# Patient Record
Sex: FEMALE | Employment: UNEMPLOYED | URBAN - METROPOLITAN AREA
[De-identification: names, ages, dates, MRNs, and addresses within clinical notes are randomized per-mention and may not be internally consistent; named-entity substitution may affect disease eponyms.]

---

## 2017-03-20 ENCOUNTER — TELEPHONE (OUTPATIENT)
Dept: INTERNAL MEDICINE CLINIC | Facility: CLINIC | Age: 42
End: 2017-03-20

## 2017-03-20 ENCOUNTER — OFFICE VISIT (OUTPATIENT)
Dept: INTERNAL MEDICINE CLINIC | Facility: CLINIC | Age: 42
End: 2017-03-20

## 2017-03-20 VITALS
BODY MASS INDEX: 24.86 KG/M2 | RESPIRATION RATE: 16 BRPM | HEART RATE: 88 BPM | SYSTOLIC BLOOD PRESSURE: 88 MMHG | DIASTOLIC BLOOD PRESSURE: 61 MMHG | WEIGHT: 164 LBS | HEIGHT: 68 IN

## 2017-03-20 DIAGNOSIS — H66.93 BILATERAL OTITIS MEDIA, UNSPECIFIED CHRONICITY, UNSPECIFIED OTITIS MEDIA TYPE: ICD-10-CM

## 2017-03-20 DIAGNOSIS — R42 VERTIGO: Primary | ICD-10-CM

## 2017-03-20 PROCEDURE — 99213 OFFICE O/P EST LOW 20 MIN: CPT | Performed by: INTERNAL MEDICINE

## 2017-03-20 PROCEDURE — 96372 THER/PROPH/DIAG INJ SC/IM: CPT | Performed by: INTERNAL MEDICINE

## 2017-03-20 PROCEDURE — 99214 OFFICE O/P EST MOD 30 MIN: CPT | Performed by: INTERNAL MEDICINE

## 2017-03-20 RX ORDER — ONDANSETRON 4 MG/1
4 TABLET, FILM COATED ORAL EVERY 8 HOURS PRN
Qty: 20 TABLET | Refills: 2 | Status: SHIPPED | OUTPATIENT
Start: 2017-03-20 | End: 2017-10-11

## 2017-03-20 RX ORDER — ONDANSETRON 4 MG/1
TABLET, FILM COATED ORAL
Qty: 20 TABLET | Refills: 2 | Status: CANCELLED | OUTPATIENT
Start: 2017-03-20

## 2017-03-20 RX ORDER — ONDANSETRON 4 MG/1
TABLET, FILM COATED ORAL
Refills: 2 | COMMUNITY
Start: 2017-02-28 | End: 2017-03-20

## 2017-03-20 RX ORDER — PREDNISONE 1 MG/1
TABLET ORAL
Qty: 15 TABLET | Refills: 0 | Status: SHIPPED | OUTPATIENT
Start: 2017-03-20 | End: 2017-10-11

## 2017-03-20 RX ORDER — AZITHROMYCIN 250 MG/1
TABLET, FILM COATED ORAL
Qty: 6 TABLET | Refills: 0 | Status: SHIPPED | OUTPATIENT
Start: 2017-03-20 | End: 2017-10-11

## 2017-03-20 RX ORDER — ONDANSETRON 2 MG/ML
4 INJECTION INTRAMUSCULAR; INTRAVENOUS ONCE
Status: COMPLETED | OUTPATIENT
Start: 2017-03-20 | End: 2017-03-20

## 2017-03-20 RX ORDER — PANTOPRAZOLE SODIUM 40 MG/1
40 TABLET, DELAYED RELEASE ORAL
Qty: 30 TABLET | Refills: 1 | Status: SHIPPED | OUTPATIENT
Start: 2017-03-20 | End: 2017-05-21

## 2017-03-20 RX ADMIN — ONDANSETRON 4 MG: 2 INJECTION INTRAMUSCULAR; INTRAVENOUS at 14:53:00

## 2017-03-20 NOTE — TELEPHONE ENCOUNTER
Pt stts she has R ear pain, dizziness and nauseous. Pt asking to speak with a nurse.   Please advise

## 2017-03-20 NOTE — TELEPHONE ENCOUNTER
Actions Requested:   Pt requesting medication / recommendations for r ear pain, dizziness and nausea. Situation/Background   Problem:  R ear pain / dizzinesss   Onset:  Last week   Associated Symptoms:  Nausea, ongoing.      History of Same:     [place]

## 2017-03-20 NOTE — ASSESSMENT & PLAN NOTE
Prednisone low doses 5 mg 2 times daily for 5 days and then once daily for 5 days. To be taken after a meal.  Z-Yovanny as directed. Call if not better in the next few days.   Avoid aspirin, ibuprofen, Motrin, Excedrin Migraine like medications due to abdomin

## 2017-03-20 NOTE — PROGRESS NOTES
HPI:    Patient ID: Raffi Tijerina is a 42year old female. Ear Pain   There is pain in the right ear. This is a new problem. The current episode started yesterday. The problem occurs constantly.  The problem has been gradually worsening (sinus pressur THEN PO Q D X 5 DAYS Disp: 15 tablet Rfl: 0   azithromycin 250 MG Oral Tab Take two tablets by mouth today, then one daily.  Disp: 6 tablet Rfl: 0   Ondansetron HCl (ZOFRAN) 4 mg tablet Take 1 tablet (4 mg total) by mouth every 8 (eight) hours as needed for Lymphadenopathy:     She has no cervical adenopathy. Neurological: She is alert and oriented to person, place, and time. She has normal reflexes. She displays no atrophy and no tremor. No cranial nerve deficit or sensory deficit.  She exhibits normal mu worsen or fail to improve. PT UNDERSTANDS AND AGREES TO FOLLOW DIRECTIONS AND ADVICE    No orders of the defined types were placed in this encounter.        Meds This Visit:  Signed Prescriptions Disp Refills    Pantoprazole Sodium 40 MG Oral Tab EC 30 tab

## 2017-03-20 NOTE — ASSESSMENT & PLAN NOTE
Vertigo with nausea and otitis media bilateral ears. No nystagmus or focal neurological deficits. Zofran IM 4 mg provided today to reduce the nausea.   Started on meclizine 12.5 mg 1 tablet 2-3 times daily as has significant fatigue at this time and wary

## 2017-03-20 NOTE — PATIENT INSTRUCTIONS
Problem List Items Addressed This Visit        Unprioritized    Otitis media of both ears     Prednisone low doses 5 mg 2 times daily for 5 days and then once daily for 5 days. To be taken after a meal.  Z-Yovanny as directed.   Call if not better in the next

## 2017-04-24 ENCOUNTER — TELEPHONE (OUTPATIENT)
Dept: INTERNAL MEDICINE CLINIC | Facility: CLINIC | Age: 42
End: 2017-04-24

## 2017-04-24 DIAGNOSIS — K90.0 CELIAC DISEASE: Primary | ICD-10-CM

## 2017-04-24 NOTE — TELEPHONE ENCOUNTER
Pt calling wanting to know if she can get labs to get her thyroid check with out coming into the office. .. please advise

## 2017-04-25 NOTE — TELEPHONE ENCOUNTER
I called pt. To see why she was requesting thyroid labs and she said she has been gaining weight since being diagnosed with celiac. Her last thyroid test was normal on 11/14/16. Please advise.

## 2017-04-26 NOTE — TELEPHONE ENCOUNTER
Orders generated. [facility], [id number] - when pt calls back, please assist w/scheduling f/u appt after labs have been completed.

## 2017-05-21 RX ORDER — PANTOPRAZOLE SODIUM 40 MG/1
TABLET, DELAYED RELEASE ORAL
Qty: 30 TABLET | Refills: 5 | Status: SHIPPED | OUTPATIENT
Start: 2017-05-21 | End: 2017-10-11

## 2017-10-05 ENCOUNTER — TELEPHONE (OUTPATIENT)
Dept: FAMILY MEDICINE CLINIC | Facility: CLINIC | Age: 42
End: 2017-10-05

## 2017-10-05 DIAGNOSIS — K90.9 INTESTINAL MALABSORPTION, UNSPECIFIED TYPE: Primary | ICD-10-CM

## 2017-10-05 NOTE — TELEPHONE ENCOUNTER
Pt is calling state that she is seeing a Nutrition next week and they are requesting some labs  Pt state that she have the orders  Want to know if she can get the lab at [facility] or do Dr REBOLLAR have to rewrite the order   Pt is requesting a call back ASAP

## 2017-10-09 NOTE — TELEPHONE ENCOUNTER
Pt called to follow up. Pt was informed to call back with what type of labs that are needed.      Pt asking for a nurse to call her  [phone]

## 2017-10-11 PROCEDURE — 82784 ASSAY IGA/IGD/IGG/IGM EACH: CPT | Performed by: INTERNAL MEDICINE

## 2017-10-11 PROCEDURE — 86255 FLUORESCENT ANTIBODY SCREEN: CPT | Performed by: INTERNAL MEDICINE

## 2017-10-11 PROCEDURE — 83516 IMMUNOASSAY NONANTIBODY: CPT | Performed by: INTERNAL MEDICINE

## 2017-10-11 PROCEDURE — 82607 VITAMIN B-12: CPT | Performed by: INTERNAL MEDICINE

## 2017-10-11 PROCEDURE — 84630 ASSAY OF ZINC: CPT | Performed by: INTERNAL MEDICINE

## 2017-10-16 NOTE — TELEPHONE ENCOUNTER
Patient called back and wants these lab tests to be ordered for her Nutrition Consult:Please advise. Blood order test pending.      Lipid profile (chemical screen, LDL,HDL and cholesterol)  Co2 Bicarbonate  ESR  CRP  Urinalysis  Blood type  Thyroid test

## 2017-10-18 ENCOUNTER — APPOINTMENT (OUTPATIENT)
Dept: LAB | Age: 42
End: 2017-10-18
Attending: INTERNAL MEDICINE
Payer: COMMERCIAL

## 2017-10-18 DIAGNOSIS — K90.9 INTESTINAL MALABSORPTION, UNSPECIFIED TYPE: ICD-10-CM

## 2017-10-18 PROCEDURE — 86140 C-REACTIVE PROTEIN: CPT

## 2017-10-18 PROCEDURE — 84439 ASSAY OF FREE THYROXINE: CPT

## 2017-10-18 PROCEDURE — 80061 LIPID PANEL: CPT

## 2017-10-18 PROCEDURE — 85652 RBC SED RATE AUTOMATED: CPT

## 2017-10-18 PROCEDURE — 81015 MICROSCOPIC EXAM OF URINE: CPT

## 2017-10-18 PROCEDURE — 84443 ASSAY THYROID STIM HORMONE: CPT

## 2017-10-18 PROCEDURE — 84481 FREE ASSAY (FT-3): CPT

## 2017-10-18 PROCEDURE — 36415 COLL VENOUS BLD VENIPUNCTURE: CPT

## 2017-10-18 NOTE — TELEPHONE ENCOUNTER
I ordered all that I can medically request,i took out the co2,blood group as cannot find necessity codes.

## 2017-10-18 NOTE — TELEPHONE ENCOUNTER
Start on vitamin d at 50,000 units once a week x 12 weeks and then go on the otc vit d at 2000 units a day

## 2017-10-18 NOTE — TELEPHONE ENCOUNTER
Pt checking status of message below; informed of Dr SMOOTH's orders and response below. Voiced understanding and agrees. Pt states Dr Erik Jones (GI) checked her vit D levels and it was low.  Per note on 10/11 result visible in chart Dr Erik Jones recommends Vit D 80

## 2017-10-25 ENCOUNTER — PATIENT MESSAGE (OUTPATIENT)
Dept: INTERNAL MEDICINE CLINIC | Facility: CLINIC | Age: 42
End: 2017-10-25

## 2017-10-26 ENCOUNTER — TELEPHONE (OUTPATIENT)
Dept: INTERNAL MEDICINE CLINIC | Facility: CLINIC | Age: 42
End: 2017-10-26

## 2017-10-27 NOTE — TELEPHONE ENCOUNTER
I left her a simple message because it did not state it was her. I said that usually not covered by insurance and if she donated blood they could give it to her.

## 2017-10-30 NOTE — TELEPHONE ENCOUNTER
From: Tavia Ayala  To: Link Maldonado MD  Sent: 10/25/2017 9:28 PM CDT  Subject: Test Results Question    Dr. Jax Cast-    What about the part of the urine sample that says many under bacteria? In red? What does that mean?   Also- do you know what blood

## 2017-11-06 PROCEDURE — 88305 TISSUE EXAM BY PATHOLOGIST: CPT | Performed by: INTERNAL MEDICINE

## 2017-11-10 RX ORDER — ERGOCALCIFEROL 1.25 MG/1
50000 CAPSULE ORAL
Qty: 12 CAPSULE | Refills: 0 | Status: SHIPPED | OUTPATIENT
Start: 2017-11-10 | End: 2018-05-30 | Stop reason: ALTCHOICE

## 2017-12-11 RX ORDER — PANTOPRAZOLE SODIUM 40 MG/1
TABLET, DELAYED RELEASE ORAL
Qty: 30 TABLET | Refills: 2 | Status: SHIPPED | OUTPATIENT
Start: 2017-12-11 | End: 2018-05-30

## 2017-12-11 NOTE — TELEPHONE ENCOUNTER
Refill Protocol Appointment Criteria  · Appointment scheduled in the past 12 months or in the next 3 months  Recent Outpatient Visits            2 months ago Celiac disease    Andrade Ramírez MD    Office Visi

## 2018-05-30 ENCOUNTER — OFFICE VISIT (OUTPATIENT)
Dept: SURGERY | Facility: CLINIC | Age: 43
End: 2018-05-30

## 2018-05-30 VITALS
BODY MASS INDEX: 26.68 KG/M2 | HEIGHT: 67 IN | SYSTOLIC BLOOD PRESSURE: 112 MMHG | RESPIRATION RATE: 18 BRPM | OXYGEN SATURATION: 98 % | DIASTOLIC BLOOD PRESSURE: 64 MMHG | HEART RATE: 80 BPM | WEIGHT: 170 LBS

## 2018-05-30 DIAGNOSIS — F41.9 ANXIETY: ICD-10-CM

## 2018-05-30 DIAGNOSIS — E53.8 VITAMIN B12 DEFICIENCY: ICD-10-CM

## 2018-05-30 DIAGNOSIS — E55.9 VITAMIN D DEFICIENCY: ICD-10-CM

## 2018-05-30 DIAGNOSIS — E66.3 OVERWEIGHT (BMI 25.0-29.9): Primary | ICD-10-CM

## 2018-05-30 DIAGNOSIS — R53.83 FATIGUE, UNSPECIFIED TYPE: ICD-10-CM

## 2018-05-30 DIAGNOSIS — E51.9 THIAMINE DEFICIENCY: ICD-10-CM

## 2018-05-30 DIAGNOSIS — R63.2 INCREASED APPETITE: ICD-10-CM

## 2018-05-30 PROCEDURE — 99204 OFFICE O/P NEW MOD 45 MIN: CPT | Performed by: NURSE PRACTITIONER

## 2018-05-30 RX ORDER — ESCITALOPRAM OXALATE 20 MG/1
TABLET ORAL
COMMUNITY
Start: 2018-02-07 | End: 2018-05-30

## 2018-05-30 NOTE — PATIENT INSTRUCTIONS
Plan:  Follow up with me in 1 month     Please try to work on the following dietary changes:  1. Drink lots of water and cut down on soda/juice consumption if soda/juice drinker  2. Eat breakfast daily (within 1 hour)  3.  Focus on protein: (15-30 grams wit

## 2018-05-30 NOTE — PROGRESS NOTES
[facility] Consultation Note       Date of Consult:      Patient:  Henri Mendez  :      1975  MRN:      [id number]    Referring Provider: Dr. Clifford ref.  provider found    Chief Complaint:  Patient presents with:  Consu times daily. Disp: 60 tablet Rfl: 1   Ondansetron HCl (ZOFRAN) 4 mg tablet Take 1 tablet (4 mg total) by mouth every 8 (eight) hours as needed for Nausea. Disp: 20 tablet Rfl: 2   Escitalopram Oxalate 20 MG Oral Tab Take 20 mg by mouth daily.  Disp:  Rfl: Plan meals in advance, Read nutrition labels, Drink 64oz of water per day, Maintain a daily food journal, No drinking 30 minutes before or after meals, Utilize portion control strategies to reduce calorie intake, Identify triggers for eating and manage cue on lexapro    Fatigue: discussed increasing exercise; should improve with weight loss    Increased appetite: Discussed healthier snacking options (veggies with hummus, fruits, nuts, low fat string cheese).  Discussed increasing exercise and keeping self bus exercise and behavior/stress management for success.      Face to face time spent with Patient this visit: 45 minutes    Follow up in 6 weeks    [facility], APN  5/30/2018

## 2018-06-02 ENCOUNTER — APPOINTMENT (OUTPATIENT)
Dept: LAB | Age: 43
End: 2018-06-02
Attending: NURSE PRACTITIONER
Payer: COMMERCIAL

## 2018-06-02 DIAGNOSIS — E55.9 VITAMIN D DEFICIENCY: ICD-10-CM

## 2018-06-02 DIAGNOSIS — E53.8 VITAMIN B12 DEFICIENCY: ICD-10-CM

## 2018-06-02 DIAGNOSIS — E51.9 THIAMINE DEFICIENCY: ICD-10-CM

## 2018-06-02 PROCEDURE — 36415 COLL VENOUS BLD VENIPUNCTURE: CPT

## 2018-06-02 PROCEDURE — 82306 VITAMIN D 25 HYDROXY: CPT

## 2018-06-02 PROCEDURE — 82607 VITAMIN B-12: CPT

## 2018-06-02 PROCEDURE — 84425 ASSAY OF VITAMIN B-1: CPT

## 2018-06-29 ENCOUNTER — NURSE TRIAGE (OUTPATIENT)
Dept: OTHER | Age: 43
End: 2018-06-29

## 2018-06-29 ENCOUNTER — HOSPITAL ENCOUNTER (EMERGENCY)
Facility: HOSPITAL | Age: 43
Discharge: HOME | End: 2018-06-29
Attending: EMERGENCY MEDICINE
Payer: COMMERCIAL

## 2018-06-29 VITALS
HEART RATE: 75 BPM | RESPIRATION RATE: 14 BRPM | OXYGEN SATURATION: 99 % | HEIGHT: 68 IN | DIASTOLIC BLOOD PRESSURE: 73 MMHG | WEIGHT: 160 LBS | SYSTOLIC BLOOD PRESSURE: 102 MMHG | TEMPERATURE: 98 F | BODY MASS INDEX: 24.25 KG/M2

## 2018-06-29 DIAGNOSIS — R42 LIGHTHEADEDNESS: Primary | ICD-10-CM

## 2018-06-29 LAB
ANION GAP SERPL CALC-SCNC: 10 MMOL/L (ref 0–18)
B-HCG UR QL: NEGATIVE
BACTERIA UR QL AUTO: NEGATIVE /HPF
BASOPHILS # BLD: 0.1 K/UL (ref 0–0.2)
BASOPHILS NFR BLD: 1 %
BILIRUB UR QL: NEGATIVE
BUN SERPL-MCNC: 11 MG/DL (ref 8–20)
BUN/CREAT SERPL: 14.3 (ref 10–20)
CALCIUM SERPL-MCNC: 9 MG/DL (ref 8.5–10.5)
CHLORIDE SERPL-SCNC: 103 MMOL/L (ref 95–110)
CO2 SERPL-SCNC: 23 MMOL/L (ref 22–32)
COLOR UR: YELLOW
CREAT SERPL-MCNC: 0.77 MG/DL (ref 0.5–1.5)
EOSINOPHIL # BLD: 0.5 K/UL (ref 0–0.7)
EOSINOPHIL NFR BLD: 7 %
ERYTHROCYTE [DISTWIDTH] IN BLOOD BY AUTOMATED COUNT: 13.7 % (ref 11–15)
GLUCOSE SERPL-MCNC: 93 MG/DL (ref 70–99)
GLUCOSE UR-MCNC: NEGATIVE MG/DL
HCT VFR BLD AUTO: 42 % (ref 35–48)
HGB BLD-MCNC: 14.3 G/DL (ref 12–16)
HGB UR QL STRIP.AUTO: NEGATIVE
KETONES UR-MCNC: NEGATIVE MG/DL
LYMPHOCYTES # BLD: 2 K/UL (ref 1–4)
LYMPHOCYTES NFR BLD: 30 %
MCH RBC QN AUTO: 28.6 PG (ref 27–32)
MCHC RBC AUTO-ENTMCNC: 34 G/DL (ref 32–37)
MCV RBC AUTO: 84.1 FL (ref 80–100)
MONOCYTES # BLD: 0.5 K/UL (ref 0–1)
MONOCYTES NFR BLD: 8 %
NEUTROPHILS # BLD AUTO: 3.5 K/UL (ref 1.8–7.7)
NEUTROPHILS NFR BLD: 54 %
NITRITE UR QL STRIP.AUTO: NEGATIVE
OSMOLALITY UR CALC.SUM OF ELEC: 281 MOSM/KG (ref 275–295)
PH UR: 5 [PH] (ref 5–8)
PLATELET # BLD AUTO: 221 K/UL (ref 140–400)
PMV BLD AUTO: 8.1 FL (ref 7.4–10.3)
POTASSIUM SERPL-SCNC: 4 MMOL/L (ref 3.3–5.1)
PROT UR-MCNC: NEGATIVE MG/DL
RBC # BLD AUTO: 4.99 M/UL (ref 3.7–5.4)
RBC #/AREA URNS AUTO: <1 /HPF
SODIUM SERPL-SCNC: 136 MMOL/L (ref 136–144)
SP GR UR STRIP: 1.02 (ref 1–1.03)
UROBILINOGEN UR STRIP-ACNC: 2
VIT C UR-MCNC: NEGATIVE MG/DL
WBC # BLD AUTO: 6.4 K/UL (ref 4–11)
WBC #/AREA URNS AUTO: <1 /HPF

## 2018-06-29 PROCEDURE — 85025 COMPLETE CBC W/AUTO DIFF WBC: CPT | Performed by: EMERGENCY MEDICINE

## 2018-06-29 PROCEDURE — 99284 EMERGENCY DEPT VISIT MOD MDM: CPT

## 2018-06-29 PROCEDURE — 93005 ELECTROCARDIOGRAM TRACING: CPT

## 2018-06-29 PROCEDURE — 93010 ELECTROCARDIOGRAM REPORT: CPT | Performed by: EMERGENCY MEDICINE

## 2018-06-29 PROCEDURE — 81001 URINALYSIS AUTO W/SCOPE: CPT | Performed by: EMERGENCY MEDICINE

## 2018-06-29 PROCEDURE — 80048 BASIC METABOLIC PNL TOTAL CA: CPT | Performed by: EMERGENCY MEDICINE

## 2018-06-29 PROCEDURE — 81025 URINE PREGNANCY TEST: CPT

## 2018-06-29 PROCEDURE — 36415 COLL VENOUS BLD VENIPUNCTURE: CPT

## 2018-06-29 NOTE — TELEPHONE ENCOUNTER
Action Requested: Summary for Provider     []  Critical Lab, Recommendations Needed  [] Need Additional Advice  []   FYI    []   Need Orders  [] Need Medications Sent to Pharmacy  []  Other     SUMMARY: Pt was advised to go to ER.     Pt states that she wok

## 2018-06-29 NOTE — PROGRESS NOTES
06/29/18 1028   Clinical Encounter Type   Visited With Family   Routine Visit Introduction   Continue Visiting Yes   Crisis Visit ED   Patient Spiritual Encounters   Spiritual Assessment Completed No   Family Spiritual Encounters   Family Coping Anxiety

## 2018-06-29 NOTE — ED PROVIDER NOTES
Patient Seen in: [facility] Emergency Department    History   Patient presents with:  Dizziness (neurologic)    Stated Complaint: Lightheaded/ Fatigue x3days    HPI    Patient is a 13-year-old female who presents to the emergency department with a Head: Normocephalic and atraumatic. Eyes: Conjunctivae and EOM are normal. Pupils are equal, round, and reactive to light. Neck: Neck supple. Cardiovascular: Normal rate, regular rhythm, normal heart sounds and normal pulses.     Pulmonary/Chest: Ef Impression:  Lightheadedness  (primary encounter diagnosis)    Disposition:  Discharge  6/29/2018 12:29 pm    Follow-up:  Jesisca Flores MD  [address] [place], [place] [address]Molly Oviedo [facility]  [phone]    Schedule an appointment as soon as possible for a visit

## 2018-06-29 NOTE — ED INITIAL ASSESSMENT (HPI)
Patient presents with c/o \"exhaustion \" for months. Jarrett Pu reports feeling lightheaded the last few days.  Also feels stressed out and \"down in the dumps\"    Denies pain

## 2018-06-30 NOTE — TELEPHONE ENCOUNTER
Per ED notes, pt to follow up with Dr. Pilar Santos for ED follow up. Call center please call pt and help set up appt.

## 2018-08-28 ENCOUNTER — OFFICE VISIT (OUTPATIENT)
Dept: INTERNAL MEDICINE CLINIC | Facility: CLINIC | Age: 43
End: 2018-08-28
Payer: COMMERCIAL

## 2018-08-28 ENCOUNTER — TELEPHONE (OUTPATIENT)
Dept: GASTROENTEROLOGY | Facility: CLINIC | Age: 43
End: 2018-08-28

## 2018-08-28 VITALS
RESPIRATION RATE: 16 BRPM | WEIGHT: 170 LBS | SYSTOLIC BLOOD PRESSURE: 111 MMHG | BODY MASS INDEX: 26.68 KG/M2 | HEART RATE: 80 BPM | HEIGHT: 67 IN | DIASTOLIC BLOOD PRESSURE: 80 MMHG

## 2018-08-28 DIAGNOSIS — E78.2 MIXED HYPERLIPIDEMIA: ICD-10-CM

## 2018-08-28 DIAGNOSIS — E55.9 VITAMIN D DEFICIENCY: ICD-10-CM

## 2018-08-28 DIAGNOSIS — K90.0 CELIAC DISEASE: ICD-10-CM

## 2018-08-28 DIAGNOSIS — R53.83 FATIGUE, UNSPECIFIED TYPE: Primary | ICD-10-CM

## 2018-08-28 DIAGNOSIS — K59.01 SLOW TRANSIT CONSTIPATION: ICD-10-CM

## 2018-08-28 DIAGNOSIS — E53.8 VITAMIN B12 DEFICIENCY: ICD-10-CM

## 2018-08-28 PROBLEM — E78.5 HYPERLIPIDEMIA: Status: ACTIVE | Noted: 2018-08-28

## 2018-08-28 PROCEDURE — 99212 OFFICE O/P EST SF 10 MIN: CPT | Performed by: INTERNAL MEDICINE

## 2018-08-28 PROCEDURE — 99215 OFFICE O/P EST HI 40 MIN: CPT | Performed by: INTERNAL MEDICINE

## 2018-08-28 RX ORDER — ROSUVASTATIN CALCIUM 5 MG/1
5 TABLET, COATED ORAL NIGHTLY
Qty: 30 TABLET | Refills: 3 | Status: SHIPPED | OUTPATIENT
Start: 2018-08-28 | End: 2018-12-30

## 2018-08-28 NOTE — PROGRESS NOTES
HPI:    Patient ID: Sherryle Olp is a 39year old female.     Last labs 10/2018    Written by Jessica Flores MD on 10/25/2017  9:20 PM   The thyroid function tests look normal.   The C-reactive protein as well as  sed rate looks normal.   The cholestero Allergies:  Gluten Flour            UNKNOWN      08/28/18  1210   BP: 111/80   Pulse: 80   Resp: 16     Body mass index is 26.63 kg/m². PHYSICAL EXAM:   Physical Exam   Constitutional: She is oriented to person, place, and time.  She appears well-dev of colon cancer. Patient is advised to follow-up with gastroenterology for possible colonoscopy.   Consider Linzess if symptoms do not improve after adequate hydration and soluble fiber supplementation         Vitamin D deficiency    Relevant Orders    VIT Comp Metabolic Panel (14) [E]      Lipid Panel [E]    Meds This Visit:  Signed Prescriptions Disp Refills    Rosuvastatin Calcium 5 MG Oral Tab 30 tablet 3      Sig: Take 1 tablet (5 mg total) by mouth nightly.            Imaging & Referrals:  None

## 2018-08-28 NOTE — ASSESSMENT & PLAN NOTE
Start on soluble fiber supplementation–Benefiber/Citrucel/Metamucil by 2 tablespoons once daily. Drink plenty of fluids. Increase vegetables like spinach, mushrooms, zucchini and implants in the diet. Multiple small meals.   Follow-up after gastroenterol

## 2018-08-28 NOTE — TELEPHONE ENCOUNTER
Slow transit constipation        Start on soluble fiber supplementation–Benefiber/Citrucel/Metamucil by 2 tablespoons once daily. Drink plenty of fluids. Increase vegetables like spinach, mushrooms, zucchini and implants in the diet.   Multiple small meal

## 2018-08-28 NOTE — TELEPHONE ENCOUNTER
Spoke to pt. appt scheduled with Dr Tyrell Johnsotn. Date, time and location verified.     Pt states she has been dx with Small Intestine Bacterial Overgrowth as well    Future Appointments  Date Time Provider Melissa Rodriguez   9/19/2018 10:30 AM Governor Bharat

## 2018-08-28 NOTE — ASSESSMENT & PLAN NOTE
Ongoing history of abdominal pain, bloating. She has been seen in [facility] as well as per gastroenterology here. She has been having increasing problems with constipation additionally. There is family history of colon cancer.   Patient is ad

## 2018-08-28 NOTE — ASSESSMENT & PLAN NOTE
Significantly elevated LDL cholesterol with increased risk for heart attacks and strokes. Recheck labs as directed within a day or 2. Start on Crestor 5 mg daily. Repeat labs in 6 weeks after starting on medication.   Strict dietary restrictions of fatty

## 2018-08-28 NOTE — PATIENT INSTRUCTIONS
Problem List Items Addressed This Visit        Unprioritized    Celiac disease     Ongoing history of abdominal pain, bloating. She has been seen in [facility] as well as per gastroenterology here.   She has been having increasing problems with

## 2018-08-29 ENCOUNTER — LAB ENCOUNTER (OUTPATIENT)
Dept: LAB | Age: 43
End: 2018-08-29
Attending: INTERNAL MEDICINE
Payer: COMMERCIAL

## 2018-08-29 DIAGNOSIS — R53.83 FATIGUE, UNSPECIFIED TYPE: ICD-10-CM

## 2018-08-29 DIAGNOSIS — E78.2 MIXED HYPERLIPIDEMIA: ICD-10-CM

## 2018-08-29 DIAGNOSIS — E55.9 VITAMIN D DEFICIENCY: ICD-10-CM

## 2018-08-29 DIAGNOSIS — E53.8 VITAMIN B12 DEFICIENCY: ICD-10-CM

## 2018-08-29 LAB
25(OH)D3 SERPL-MCNC: 32.5 NG/ML
ALBUMIN SERPL BCP-MCNC: 4 G/DL (ref 3.5–4.8)
ALBUMIN/GLOB SERPL: 1.5 {RATIO} (ref 1–2)
ALP SERPL-CCNC: 47 U/L (ref 32–100)
ALT SERPL-CCNC: 23 U/L (ref 14–54)
ANION GAP SERPL CALC-SCNC: 10 MMOL/L (ref 0–18)
AST SERPL-CCNC: 23 U/L (ref 15–41)
BASOPHILS # BLD: 0 K/UL (ref 0–0.2)
BASOPHILS NFR BLD: 1 %
BILIRUB SERPL-MCNC: 0.8 MG/DL (ref 0.3–1.2)
BILIRUB UR QL: NEGATIVE
BUN SERPL-MCNC: 11 MG/DL (ref 8–20)
BUN/CREAT SERPL: 15.3 (ref 10–20)
CALCIUM SERPL-MCNC: 9 MG/DL (ref 8.5–10.5)
CHLORIDE SERPL-SCNC: 104 MMOL/L (ref 95–110)
CHOLEST SERPL-MCNC: 207 MG/DL (ref 110–200)
CO2 SERPL-SCNC: 25 MMOL/L (ref 22–32)
COLOR UR: YELLOW
CREAT SERPL-MCNC: 0.72 MG/DL (ref 0.5–1.5)
EOSINOPHIL # BLD: 0.1 K/UL (ref 0–0.7)
EOSINOPHIL NFR BLD: 3 %
ERYTHROCYTE [DISTWIDTH] IN BLOOD BY AUTOMATED COUNT: 13.7 % (ref 11–15)
GLOBULIN PLAS-MCNC: 2.7 G/DL (ref 2.5–3.7)
GLUCOSE SERPL-MCNC: 90 MG/DL (ref 70–99)
GLUCOSE UR-MCNC: NEGATIVE MG/DL
HCT VFR BLD AUTO: 39.3 % (ref 35–48)
HDLC SERPL-MCNC: 35 MG/DL
HGB BLD-MCNC: 13.3 G/DL (ref 12–16)
HGB UR QL STRIP.AUTO: NEGATIVE
KETONES UR-MCNC: NEGATIVE MG/DL
LDLC SERPL CALC-MCNC: 149 MG/DL (ref 0–99)
LEUKOCYTE ESTERASE UR QL STRIP.AUTO: NEGATIVE
LYMPHOCYTES # BLD: 1.4 K/UL (ref 1–4)
LYMPHOCYTES NFR BLD: 31 %
MCH RBC QN AUTO: 28.7 PG (ref 27–32)
MCHC RBC AUTO-ENTMCNC: 33.8 G/DL (ref 32–37)
MCV RBC AUTO: 85 FL (ref 80–100)
MONOCYTES # BLD: 0.3 K/UL (ref 0–1)
MONOCYTES NFR BLD: 6 %
NEUTROPHILS # BLD AUTO: 2.6 K/UL (ref 1.8–7.7)
NEUTROPHILS NFR BLD: 59 %
NITRITE UR QL STRIP.AUTO: NEGATIVE
NONHDLC SERPL-MCNC: 172 MG/DL
OSMOLALITY UR CALC.SUM OF ELEC: 287 MOSM/KG (ref 275–295)
PATIENT FASTING: YES
PH UR: 6 [PH] (ref 5–8)
PLATELET # BLD AUTO: 206 K/UL (ref 140–400)
PMV BLD AUTO: 8.9 FL (ref 7.4–10.3)
POTASSIUM SERPL-SCNC: 3.8 MMOL/L (ref 3.3–5.1)
PROT SERPL-MCNC: 6.7 G/DL (ref 5.9–8.4)
PROT UR-MCNC: 30 MG/DL
RBC # BLD AUTO: 4.63 M/UL (ref 3.7–5.4)
RBC #/AREA URNS AUTO: 1 /HPF
SODIUM SERPL-SCNC: 139 MMOL/L (ref 136–144)
SP GR UR STRIP: 1.03 (ref 1–1.03)
TRIGL SERPL-MCNC: 117 MG/DL (ref 1–149)
TSH SERPL-ACNC: 0.92 UIU/ML (ref 0.45–5.33)
UROBILINOGEN UR STRIP-ACNC: 2
VIT B12 SERPL-MCNC: 1472 PG/ML (ref 181–914)
VIT C UR-MCNC: NEGATIVE MG/DL
WBC # BLD AUTO: 4.4 K/UL (ref 4–11)
WBC #/AREA URNS AUTO: 2 /HPF

## 2018-08-29 PROCEDURE — 84443 ASSAY THYROID STIM HORMONE: CPT

## 2018-08-29 PROCEDURE — 82306 VITAMIN D 25 HYDROXY: CPT

## 2018-08-29 PROCEDURE — 81001 URINALYSIS AUTO W/SCOPE: CPT

## 2018-08-29 PROCEDURE — 80061 LIPID PANEL: CPT

## 2018-08-29 PROCEDURE — 85025 COMPLETE CBC W/AUTO DIFF WBC: CPT

## 2018-08-29 PROCEDURE — 80053 COMPREHEN METABOLIC PANEL: CPT

## 2018-08-29 PROCEDURE — 82607 VITAMIN B-12: CPT

## 2018-08-29 PROCEDURE — 36415 COLL VENOUS BLD VENIPUNCTURE: CPT

## 2018-08-31 ENCOUNTER — TELEPHONE (OUTPATIENT)
Dept: OTHER | Age: 43
End: 2018-08-31

## 2018-08-31 DIAGNOSIS — E78.00 ELEVATED CHOLESTEROL: Primary | ICD-10-CM

## 2018-08-31 NOTE — TELEPHONE ENCOUNTER
Patient calling regarding lab results done on 08/29/18. Patient very anxious about getting results. Would like to get results today with Dr. Zahra Zheng recommendations.

## 2018-08-31 NOTE — TELEPHONE ENCOUNTER
Notes recorded by Chaya Jeffers MD on 8/31/2018 at 5:11 PM CDT  The blood counts look normal.no anemia.   The sugars ,calcium and electrolytes are normal.  The kidney and liver functions look normal.  The thyroid functions look normal.  The urine tests are

## 2018-09-04 NOTE — TELEPHONE ENCOUNTER
Advised patient on Dr. Apolonia Weathers information and recommendations. Patient verbalized understanding. She has started taking the statin. Lab order created. Advised to call back if needed.

## 2018-09-19 ENCOUNTER — TELEPHONE (OUTPATIENT)
Dept: GASTROENTEROLOGY | Facility: CLINIC | Age: 43
End: 2018-09-19

## 2018-09-19 NOTE — TELEPHONE ENCOUNTER
Pt states she was up all night throwing up and could not make todays appointment and was diagnosed with sibo .  Please call thank you

## 2018-09-24 NOTE — TELEPHONE ENCOUNTER
I spoke to the pt. We made a f/u appt with Dr Hussain Snow. She has a copy of her breath test that confirms the SIBO.  Date time and location verified    Future Appointments   Date Time Provider Melisas Rodriguez   10/15/2018  2:00 PM Orest Habermann, MD

## 2018-10-15 ENCOUNTER — TELEPHONE (OUTPATIENT)
Dept: GASTROENTEROLOGY | Facility: CLINIC | Age: 43
End: 2018-10-15

## 2018-10-15 ENCOUNTER — OFFICE VISIT (OUTPATIENT)
Dept: GASTROENTEROLOGY | Facility: CLINIC | Age: 43
End: 2018-10-15
Payer: COMMERCIAL

## 2018-10-15 VITALS
HEIGHT: 67.75 IN | BODY MASS INDEX: 26.89 KG/M2 | SYSTOLIC BLOOD PRESSURE: 114 MMHG | WEIGHT: 175.38 LBS | HEART RATE: 83 BPM | DIASTOLIC BLOOD PRESSURE: 79 MMHG

## 2018-10-15 DIAGNOSIS — K90.0 CELIAC DISEASE: Primary | ICD-10-CM

## 2018-10-15 DIAGNOSIS — K59.01 SLOW TRANSIT CONSTIPATION: ICD-10-CM

## 2018-10-15 DIAGNOSIS — R11.0 NAUSEA: ICD-10-CM

## 2018-10-15 DIAGNOSIS — K58.1 IRRITABLE BOWEL SYNDROME WITH CONSTIPATION: ICD-10-CM

## 2018-10-15 PROCEDURE — 99214 OFFICE O/P EST MOD 30 MIN: CPT | Performed by: INTERNAL MEDICINE

## 2018-10-15 PROCEDURE — 99212 OFFICE O/P EST SF 10 MIN: CPT | Performed by: INTERNAL MEDICINE

## 2018-10-15 RX ORDER — ONDANSETRON 4 MG/1
TABLET, FILM COATED ORAL EVERY 12 HOURS PRN
Qty: 30 TABLET | Refills: 11 | Status: SHIPPED | OUTPATIENT
Start: 2018-10-15 | End: 2020-11-03

## 2018-10-15 NOTE — TELEPHONE ENCOUNTER
[address]    MR #: [id number]  : 1975  [facility]    PHYSICIAN: Chano Velásquez M.D. Operative Report      DATE OF PROCEDURE:  2016      PREOPERATIVE DIAGNOSES:  1. Dyspepsia.   2. Abdominal Retroflexion was performed in the stomach at the beginning of the case. EGD FINDINGS: Normal esophagus down to the GE junction. Normal Z-line and GE junction.  Further in, the cardia, fundus, and body of the stomach were entirely normal. There were deep determining whether Ms. Sesay suffers celiac disease. Her fairly troubling symptoms are consistent both with celiac disease or stress, functional process. High positive celiac serology was 10 days ago.          Electronically Approved by:      Lesly Tejeda

## 2018-10-15 NOTE — PROGRESS NOTES
HPI:    Patient ID: Ronald Anthony returns today with her very supportive mother for follow-up. After the initial visit below, EGD and biopsies 4/28/2016 along with positive celiac serologies, anemia were diagnostic of celiac disease.     Ms. Swati Fletcher we previously followed only with her gynecologist and the immediate care centers. Recently saw Dr. Mario Mendez to initiate care with an Internist.    She discussed lifelong symptoms includin. Lifelong constipation.   She can go up to 1 week without passing far.    Non-smoker.    ======================    [facility]     Chris Chavez    MR #: [id number]  : 1975  [facility]    PHYSICIAN: Anitha Bernal M.D.        Operative Report      DATE OF PROCEDURE:  2016        Rossana Levi portions of duodenum and on to the jejunum. Retroflexion was performed in the stomach at the beginning of the case. EGD FINDINGS: Normal esophagus down to the GE junction. Normal Z-line and GE junction.  Further in, the cardia, fundus, and body of the st follow after review of the above biopsies in determining whether Ms. Sesay suffers celiac disease. Her fairly troubling symptoms are consistent both with celiac disease or stress, functional process. High positive celiac serology was 10 days ago. 11/6/2017     Referring physician: Luis Eduardo Cobian MD     Surgeon:  Sammie Frausto.  Jose Espinosa MD     Pre-op diagnosis: Celiac disease     Post-op diagnosis: Same     Medications given:  MAC        Procedure:                After informed consent, discussion of risks PAIN    Comment:Constipation   PHYSICAL EXAM:   Physical Exam              ASSESSMENT/PLAN:   No diagnosis found. Labs 8/29/2018:  Normal CBC with hemoglobin 13.3 g MCV 85  Normal CMP with AST 23 ALT 23 albumin 4.0 8/29/2018.     Vitamin B12 174 ( for ?small bowel bacterial overgrowth by testing with an alternative medicine provider. Suggest:    1. Celiac disease by serologies and biopsy  · Partial, incomplete response to gluten-free diet. Mild–moderate improvement only.   No weight loss or weig

## 2018-11-08 ENCOUNTER — OFFICE VISIT (OUTPATIENT)
Dept: SURGERY | Facility: CLINIC | Age: 43
End: 2018-11-08
Payer: COMMERCIAL

## 2018-11-08 VITALS
DIASTOLIC BLOOD PRESSURE: 83 MMHG | WEIGHT: 176 LBS | HEART RATE: 74 BPM | OXYGEN SATURATION: 99 % | HEIGHT: 67.5 IN | RESPIRATION RATE: 18 BRPM | SYSTOLIC BLOOD PRESSURE: 115 MMHG | BODY MASS INDEX: 27.3 KG/M2

## 2018-11-08 DIAGNOSIS — Z51.81 ENCOUNTER FOR THERAPEUTIC DRUG MONITORING: ICD-10-CM

## 2018-11-08 DIAGNOSIS — F43.9 STRESS: ICD-10-CM

## 2018-11-08 DIAGNOSIS — E66.3 OVERWEIGHT (BMI 25.0-29.9): ICD-10-CM

## 2018-11-08 DIAGNOSIS — E78.00 HYPERCHOLESTEREMIA: Primary | ICD-10-CM

## 2018-11-08 PROCEDURE — 99214 OFFICE O/P EST MOD 30 MIN: CPT | Performed by: INTERNAL MEDICINE

## 2018-11-08 RX ORDER — LACTOBACIL 2/BIFIDO 1/S.THERMO 450B CELL
1 PACKET (EA) ORAL DAILY
Qty: 60 CAPSULE | Refills: 2 | Status: SHIPPED | OUTPATIENT
Start: 2018-11-08 | End: 2019-03-20 | Stop reason: ALTCHOICE

## 2018-11-08 RX ORDER — TOPIRAMATE 25 MG/1
25 TABLET ORAL EVERY EVENING
Qty: 30 TABLET | Refills: 2 | Status: SHIPPED | OUTPATIENT
Start: 2018-11-08 | End: 2019-02-13

## 2018-11-08 NOTE — PROGRESS NOTES
[address], [address]  Dept: [phone]       Patient:  Leeroy Greene  :      1975  MRN:      [id number]    Chief Complaint:  Patient presents w mouth nightly. Disp: 30 tablet Rfl: 3   Ondansetron HCl (ZOFRAN) 4 mg tablet Take 1 tablet (4 mg total) by mouth every 8 (eight) hours as needed for Nausea. Disp: 20 tablet Rfl: 2   Escitalopram Oxalate 20 MG Oral Tab Take 20 mg by mouth daily.  Disp:  Rfl: exercising? no  · Type of exercise?      Eating Habits  · Patient states the following:  · Eats 3 meal(s) per day  · Length of time it takes to consume a meal:  20  · # of snacks per day: 1 Type of snacks:  Sugar (chocolate)  · Amount of soda consumption pe cholesterol has been well controlled on her current medication.     Lab Results   Component Value Date/Time    CHOLEST 207 (H) 08/29/2018 08:54 AM     (H) 08/29/2018 08:54 AM    HDL 35 08/29/2018 08:54 AM    TRIG 117 08/29/2018 08:54 AM       Encount

## 2018-12-30 DIAGNOSIS — E78.2 MIXED HYPERLIPIDEMIA: ICD-10-CM

## 2019-01-05 RX ORDER — ROSUVASTATIN CALCIUM 5 MG/1
TABLET, COATED ORAL
Qty: 30 TABLET | Refills: 3 | Status: SHIPPED | OUTPATIENT
Start: 2019-01-05 | End: 2019-04-30

## 2019-01-15 ENCOUNTER — TELEPHONE (OUTPATIENT)
Dept: OTHER | Age: 44
End: 2019-01-15

## 2019-01-15 NOTE — TELEPHONE ENCOUNTER
Spoke with patient who is requesting to know when she was suppose to return to the clinic for a lipid panel draw.  Patient was made aware she was suppose to return to the office 6 weeks after her last draw on 8/29/18 since she was started on Crestor at that

## 2019-01-16 ENCOUNTER — APPOINTMENT (OUTPATIENT)
Dept: LAB | Age: 44
End: 2019-01-16
Attending: INTERNAL MEDICINE
Payer: COMMERCIAL

## 2019-01-16 DIAGNOSIS — E78.2 MIXED HYPERLIPIDEMIA: ICD-10-CM

## 2019-01-16 DIAGNOSIS — E78.00 ELEVATED CHOLESTEROL: ICD-10-CM

## 2019-01-16 LAB
ALBUMIN SERPL BCP-MCNC: 4.2 G/DL (ref 3.5–4.8)
ALBUMIN/GLOB SERPL: 1.5 {RATIO} (ref 1–2)
ALP SERPL-CCNC: 52 U/L (ref 32–100)
ALT SERPL-CCNC: 21 U/L (ref 14–54)
ANION GAP SERPL CALC-SCNC: 8 MMOL/L (ref 0–18)
AST SERPL-CCNC: 23 U/L (ref 15–41)
BILIRUB SERPL-MCNC: 0.9 MG/DL (ref 0.3–1.2)
BUN SERPL-MCNC: 18 MG/DL (ref 8–20)
BUN/CREAT SERPL: 19.6 (ref 10–20)
CALCIUM SERPL-MCNC: 9.2 MG/DL (ref 8.5–10.5)
CHLORIDE SERPL-SCNC: 106 MMOL/L (ref 95–110)
CHOLEST SERPL-MCNC: 218 MG/DL (ref 110–200)
CO2 SERPL-SCNC: 23 MMOL/L (ref 22–32)
CREAT SERPL-MCNC: 0.92 MG/DL (ref 0.5–1.5)
GLOBULIN PLAS-MCNC: 2.8 G/DL (ref 2.5–3.7)
GLUCOSE SERPL-MCNC: 88 MG/DL (ref 70–99)
HDLC SERPL-MCNC: 38 MG/DL
LDLC SERPL CALC-MCNC: 164 MG/DL (ref 0–99)
NONHDLC SERPL-MCNC: 180 MG/DL
OSMOLALITY UR CALC.SUM OF ELEC: 285 MOSM/KG (ref 275–295)
PATIENT FASTING: YES
POTASSIUM SERPL-SCNC: 3.6 MMOL/L (ref 3.3–5.1)
PROT SERPL-MCNC: 7 G/DL (ref 5.9–8.4)
SODIUM SERPL-SCNC: 137 MMOL/L (ref 136–144)
TRIGL SERPL-MCNC: 79 MG/DL (ref 1–149)

## 2019-01-16 PROCEDURE — 80061 LIPID PANEL: CPT

## 2019-01-16 PROCEDURE — 36415 COLL VENOUS BLD VENIPUNCTURE: CPT

## 2019-01-16 PROCEDURE — 80053 COMPREHEN METABOLIC PANEL: CPT

## 2019-01-18 ENCOUNTER — OFFICE VISIT (OUTPATIENT)
Dept: OTOLARYNGOLOGY | Facility: CLINIC | Age: 44
End: 2019-01-18
Payer: COMMERCIAL

## 2019-01-18 VITALS
DIASTOLIC BLOOD PRESSURE: 69 MMHG | WEIGHT: 155 LBS | TEMPERATURE: 98 F | SYSTOLIC BLOOD PRESSURE: 93 MMHG | HEIGHT: 68 IN | BODY MASS INDEX: 23.49 KG/M2

## 2019-01-18 DIAGNOSIS — J34.89 NASAL LESION: ICD-10-CM

## 2019-01-18 DIAGNOSIS — H92.01 RIGHT EAR PAIN: Primary | ICD-10-CM

## 2019-01-18 PROCEDURE — 99243 OFF/OP CNSLTJ NEW/EST LOW 30: CPT | Performed by: OTOLARYNGOLOGY

## 2019-01-18 PROCEDURE — 99212 OFFICE O/P EST SF 10 MIN: CPT | Performed by: OTOLARYNGOLOGY

## 2019-01-18 NOTE — PROGRESS NOTES
Eryn Mancilla is a 39year old female. Patient presents with:  Ear Problem: on and off right ear pain for 2 months    HPI:   She has had pain in her right ear intermittently for the last 2 months.   The pain can be very severe and last for a few days at weight gain  SKIN: denies any unusual skin lesions or rashes  RESPIRATORY: denies shortness of breath with exertion  NEURO: denies headaches    EXAM:   BP 93/69   Temp 97.9 °F (36.6 °C) (Tympanic)   Ht 5' 8\" (1.727 m)   Wt 155 lb (70.3 kg)   BMI 23.57 kg/ issues and agrees to the plan. Bj Castellanos MD  1/18/2019  11:39 AM

## 2019-01-24 ENCOUNTER — TELEPHONE (OUTPATIENT)
Dept: OTHER | Age: 44
End: 2019-01-24

## 2019-01-24 NOTE — TELEPHONE ENCOUNTER
Please advise on Appt for Next week Monday or Tuesday   Pt stated she have an appt scheduled for today  C/C dizziness/review test results  but now have to Cancel d/t her son has an emergency appt with the cardiologist because he have been lightheaded  And

## 2019-01-25 NOTE — TELEPHONE ENCOUNTER
Tried to call pt but after several rings it turns to busy signal. Called back pt @ 8:07AM and LMTCB see msg below.

## 2019-02-06 ENCOUNTER — TELEPHONE (OUTPATIENT)
Dept: SURGERY | Facility: CLINIC | Age: 44
End: 2019-02-06

## 2019-02-13 ENCOUNTER — OFFICE VISIT (OUTPATIENT)
Dept: SURGERY | Facility: CLINIC | Age: 44
End: 2019-02-13
Payer: COMMERCIAL

## 2019-02-13 VITALS
RESPIRATION RATE: 18 BRPM | OXYGEN SATURATION: 98 % | HEIGHT: 68 IN | SYSTOLIC BLOOD PRESSURE: 116 MMHG | HEART RATE: 87 BPM | WEIGHT: 180.25 LBS | BODY MASS INDEX: 27.32 KG/M2 | DIASTOLIC BLOOD PRESSURE: 80 MMHG

## 2019-02-13 DIAGNOSIS — F43.9 STRESS: ICD-10-CM

## 2019-02-13 DIAGNOSIS — Z51.81 ENCOUNTER FOR THERAPEUTIC DRUG MONITORING: ICD-10-CM

## 2019-02-13 DIAGNOSIS — E78.00 HYPERCHOLESTEREMIA: Primary | ICD-10-CM

## 2019-02-13 DIAGNOSIS — R63.5 WEIGHT GAIN: ICD-10-CM

## 2019-02-13 DIAGNOSIS — R63.2 INCREASED APPETITE: ICD-10-CM

## 2019-02-13 PROCEDURE — 99214 OFFICE O/P EST MOD 30 MIN: CPT | Performed by: INTERNAL MEDICINE

## 2019-02-13 NOTE — PROGRESS NOTES
[address]  Dept: [phone]       Patient:  Leeroy Greene  :      1975  MRN:      [id number]    Chief Complaint:  Patient presents w on file    Social Needs      Financial resource strain: Not on file      Food insecurity - worry: Not on file      Food insecurity - inability: Not on file      Transportation needs - medical: Not on file      Transportation needs - non-medical: Not on brett fresh fruits or vegetables daily    Behavior Modifications Reviewed and Discussed  Eat breakfast, Eat 3 meals per day, Plan meals in advance, Read nutrition labels, Drink 64 oz of water per day, Maintain a daily food journal, No drinking 30 minutes before at this time. Stress: may benefit from zoloft. Does not want to switch yet    DYSLIPIDEMIA: Stable on the above prescribed meal plan and medication. Liver function stable.     Lab Results   Component Value Date/Time    CHOLEST 218 (H) 01/16/2019 08:50

## 2019-03-12 ENCOUNTER — APPOINTMENT (OUTPATIENT)
Dept: FAMILY MEDICINE CLINIC | Facility: CLINIC | Age: 44
End: 2019-03-12
Payer: COMMERCIAL

## 2019-03-13 RX ORDER — TOPIRAMATE 25 MG/1
25 TABLET ORAL EVERY EVENING
Qty: 30 TABLET | Refills: 2 | Status: SHIPPED | OUTPATIENT
Start: 2019-03-13 | End: 2019-03-20 | Stop reason: ALTCHOICE

## 2019-03-20 ENCOUNTER — OFFICE VISIT (OUTPATIENT)
Dept: INTERNAL MEDICINE CLINIC | Facility: CLINIC | Age: 44
End: 2019-03-20
Payer: COMMERCIAL

## 2019-03-20 ENCOUNTER — TELEPHONE (OUTPATIENT)
Dept: INTERNAL MEDICINE CLINIC | Facility: CLINIC | Age: 44
End: 2019-03-20

## 2019-03-20 ENCOUNTER — NURSE TRIAGE (OUTPATIENT)
Dept: OTHER | Age: 44
End: 2019-03-20

## 2019-03-20 VITALS
WEIGHT: 185 LBS | HEIGHT: 68 IN | BODY MASS INDEX: 28.04 KG/M2 | SYSTOLIC BLOOD PRESSURE: 121 MMHG | HEART RATE: 91 BPM | DIASTOLIC BLOOD PRESSURE: 83 MMHG | TEMPERATURE: 98 F

## 2019-03-20 DIAGNOSIS — J06.9 URI WITH COUGH AND CONGESTION: Primary | ICD-10-CM

## 2019-03-20 PROCEDURE — 99212 OFFICE O/P EST SF 10 MIN: CPT | Performed by: PHYSICIAN ASSISTANT

## 2019-03-20 PROCEDURE — 99213 OFFICE O/P EST LOW 20 MIN: CPT | Performed by: PHYSICIAN ASSISTANT

## 2019-03-20 RX ORDER — CODEINE PHOSPHATE AND GUAIFENESIN 10; 100 MG/5ML; MG/5ML
5 SOLUTION ORAL EVERY 6 HOURS PRN
Qty: 1 BOTTLE | Refills: 0 | Status: SHIPPED | OUTPATIENT
Start: 2019-03-20 | End: 2020-11-12

## 2019-03-20 RX ORDER — AZITHROMYCIN 250 MG/1
TABLET, FILM COATED ORAL
Qty: 6 TABLET | Refills: 0 | Status: SHIPPED | OUTPATIENT
Start: 2019-03-20 | End: 2020-11-12

## 2019-03-20 NOTE — TELEPHONE ENCOUNTER
Pt would like to know if she can be seen before May for follow up on Cholesterol pt will be out of town until  April 1, 2019.

## 2019-03-20 NOTE — TELEPHONE ENCOUNTER
Action Requested: Summary for Provider     []  Critical Lab, Recommendations Needed  [] Need Additional Advice  [x]   FYI    []   Need Orders  [] Need Medications Sent to Pharmacy  []  Other     SUMMARY: dry cough, sinus pressure, headache, sore throat, po

## 2019-03-20 NOTE — PROGRESS NOTES
HPI:    Patient ID: Bladimir Quispe is a 39year old female. HPI     Patient presents complaining of a dry cough, sinus pressure, headache, sore throat, postnasal drip, body aches, chills, nausea and rhinorrhea that is clear for the past 9 days.  She st Pregnancy     Per NG: vaginal deliveries times 3   • S/P cystoscopy 2014    Per NG: Stent placement cystoscopy   • S/P cystoscopy 2014    per NG: Stent removal cystoscopy   • Urolithiasis    • Vertigo 2013     Social History    Tobacco Use      Smoking sta gallop and no friction rub. No murmur heard. Edema not present. Pulmonary/Chest: Effort normal and breath sounds normal. No respiratory distress. She has no wheezes. She has no rales. Cough with expiration    Abdominal: Soft.  Bowel sounds are nicolette

## 2019-03-21 ENCOUNTER — TELEPHONE (OUTPATIENT)
Dept: OTHER | Age: 44
End: 2019-03-21

## 2019-03-21 NOTE — TELEPHONE ENCOUNTER
Received a call from [address] [place] [address] who reports the prescription for Cheratussin [facility] written yesterday 3/20/19 orders for one bottle to be dispensed, but the pharmacy reports that is too much medicine to be dispensed.  Pharmacy reports usually 4-6oz of the med

## 2019-03-21 NOTE — TELEPHONE ENCOUNTER
Advised patient of Dr Tomasa Ward note below and that will call in to [facility] right now. Patient verbalized understanding. Advised [facility] pharmacist-Irma of Dr. Tomasa Ward note. Pharmacist verbalized understanding.

## 2019-04-08 ENCOUNTER — HOSPITAL ENCOUNTER (OUTPATIENT)
Dept: CT IMAGING | Age: 44
Discharge: HOME | End: 2019-04-08
Attending: INTERNAL MEDICINE

## 2019-04-08 DIAGNOSIS — Z13.6 ENCOUNTER FOR SCREENING FOR CARDIOVASCULAR DISORDERS: ICD-10-CM

## 2019-04-08 NOTE — PROGRESS NOTES
Pt seen at [facility], [facility] for CTHS:  PRELIMINARY SCORE= 82.0  BP= 98/78    Cholestec labs as follows: Nonfasting  TC= 188  HDL= 37  LDL= 126  TG= 123  GLUCOSE= 85    All results and risk factors discussed with patient; all questions and concerns addressed.

## 2019-04-12 NOTE — TELEPHONE ENCOUNTER
Please set up an appointment for this patient in the next few weeks before April 21, may use any available appointment or add at the lunch slot-as long as no other patients are booked at that time

## 2019-04-30 DIAGNOSIS — E78.2 MIXED HYPERLIPIDEMIA: ICD-10-CM

## 2019-05-01 NOTE — TELEPHONE ENCOUNTER
Please review; protocol failed.  D/t labs  Requested Prescriptions     Pending Prescriptions Disp Refills   • ROSUVASTATIN CALCIUM 5 MG Oral Tab [Pharmacy Med Name: ROSUVASTATIN CALCIUM 5 MG TAB] 30 tablet 3     Sig: TAKE 1 TABLET BY MOUTH EVERY DAY AT [facility]

## 2019-05-03 RX ORDER — ROSUVASTATIN CALCIUM 5 MG/1
TABLET, COATED ORAL
Qty: 30 TABLET | Refills: 3 | Status: SHIPPED | OUTPATIENT
Start: 2019-05-03 | End: 2019-08-29

## 2019-06-09 ENCOUNTER — HOSPITAL ENCOUNTER (EMERGENCY)
Facility: HOSPITAL | Age: 44
Discharge: HOME | End: 2019-06-09
Attending: EMERGENCY MEDICINE
Payer: COMMERCIAL

## 2019-06-09 VITALS
HEART RATE: 75 BPM | OXYGEN SATURATION: 100 % | SYSTOLIC BLOOD PRESSURE: 106 MMHG | WEIGHT: 145 LBS | BODY MASS INDEX: 21.98 KG/M2 | TEMPERATURE: 98 F | HEIGHT: 68 IN | DIASTOLIC BLOOD PRESSURE: 79 MMHG | RESPIRATION RATE: 12 BRPM

## 2019-06-09 DIAGNOSIS — R00.2 PALPITATIONS: Primary | ICD-10-CM

## 2019-06-09 PROCEDURE — 81001 URINALYSIS AUTO W/SCOPE: CPT | Performed by: EMERGENCY MEDICINE

## 2019-06-09 PROCEDURE — 93010 ELECTROCARDIOGRAM REPORT: CPT | Performed by: EMERGENCY MEDICINE

## 2019-06-09 PROCEDURE — 85025 COMPLETE CBC W/AUTO DIFF WBC: CPT | Performed by: EMERGENCY MEDICINE

## 2019-06-09 PROCEDURE — 83735 ASSAY OF MAGNESIUM: CPT | Performed by: EMERGENCY MEDICINE

## 2019-06-09 PROCEDURE — 96374 THER/PROPH/DIAG INJ IV PUSH: CPT

## 2019-06-09 PROCEDURE — 84443 ASSAY THYROID STIM HORMONE: CPT | Performed by: EMERGENCY MEDICINE

## 2019-06-09 PROCEDURE — 80048 BASIC METABOLIC PNL TOTAL CA: CPT | Performed by: EMERGENCY MEDICINE

## 2019-06-09 PROCEDURE — 99284 EMERGENCY DEPT VISIT MOD MDM: CPT

## 2019-06-09 PROCEDURE — 81025 URINE PREGNANCY TEST: CPT

## 2019-06-09 PROCEDURE — 93005 ELECTROCARDIOGRAM TRACING: CPT

## 2019-06-09 RX ORDER — ONDANSETRON 4 MG/1
4 TABLET, ORALLY DISINTEGRATING ORAL EVERY 8 HOURS PRN
Qty: 15 TABLET | Refills: 0 | Status: SHIPPED | OUTPATIENT
Start: 2019-06-09 | End: 2019-06-09

## 2019-06-09 RX ORDER — ONDANSETRON 2 MG/ML
4 INJECTION INTRAMUSCULAR; INTRAVENOUS ONCE
Status: COMPLETED | OUTPATIENT
Start: 2019-06-09 | End: 2019-06-09

## 2019-06-09 RX ORDER — ONDANSETRON 4 MG/1
4 TABLET, ORALLY DISINTEGRATING ORAL EVERY 8 HOURS PRN
Qty: 15 TABLET | Refills: 0 | Status: SHIPPED | OUTPATIENT
Start: 2019-06-09 | End: 2019-06-14

## 2019-06-09 RX ORDER — CLONAZEPAM 1 MG/1
1 TABLET ORAL NIGHTLY PRN
Qty: 3 TABLET | Refills: 0 | Status: SHIPPED | OUTPATIENT
Start: 2019-06-09 | End: 2019-06-09

## 2019-06-09 RX ORDER — CLONAZEPAM 1 MG/1
1 TABLET ORAL NIGHTLY PRN
Qty: 3 TABLET | Refills: 0 | Status: SHIPPED | OUTPATIENT
Start: 2019-06-09 | End: 2019-06-12

## 2019-06-09 NOTE — ED PROVIDER NOTES
Patient Seen in: [facility] Emergency Department    History   Patient presents with:  Arrythmia/Palpitations (cardiovascular)    Stated Complaint:  palpitatiosn    HPI    38 yo F with PMH celiac disease, HL presenting for evaluation of palpitations Mother    • Hypertension Mother    • Lipids Mother    • Other (Other) Mother    • Diabetes Paternal Grandmother    • Colon Cancer Paternal Grandmother    • Other (possible celiac disease) Son    • Colon Cancer Maternal Grandmother        Social History METABOLIC PANEL (8) - Normal   MAGNESIUM - Normal   TSH W REFLEX TO FREE T4 - Normal   EMH POCT PREGNANCY URINE - Normal   CBC WITH DIFFERENTIAL WITH PLATELET    Narrative:      The following orders were created for panel order CBC WITH DIFFERENTIAL WITH PL [address]  [phone]    Call  For followup, re-evaluation and possible Holter monitor.       Medications Prescribed:  Current Discharge Medication List    START taking these medications    ondansetron 4 MG Oral Tablet Dispersible  Take 1 tablet (4 mg total)

## 2019-08-29 DIAGNOSIS — E78.2 MIXED HYPERLIPIDEMIA: ICD-10-CM

## 2019-08-30 RX ORDER — ROSUVASTATIN CALCIUM 5 MG/1
TABLET, COATED ORAL
Qty: 90 TABLET | Refills: 1 | Status: SHIPPED | OUTPATIENT
Start: 2019-08-30 | End: 2020-03-15

## 2019-08-30 NOTE — TELEPHONE ENCOUNTER
Please review; protocol failed.     Requested Prescriptions     Pending Prescriptions Disp Refills   • ROSUVASTATIN CALCIUM 5 MG Oral Tab [Pharmacy Med Name: ROSUVASTATIN CALCIUM 5 MG TAB] 30 tablet 3     Sig: TAKE 1 TABLET BY MOUTH EVERY DAY AT NIGHT

## 2019-09-20 ENCOUNTER — HOSPITAL ENCOUNTER (OUTPATIENT)
Age: 44
Discharge: HOME | End: 2019-09-20
Attending: EMERGENCY MEDICINE
Payer: COMMERCIAL

## 2019-09-20 VITALS
BODY MASS INDEX: 21.98 KG/M2 | DIASTOLIC BLOOD PRESSURE: 79 MMHG | WEIGHT: 145 LBS | OXYGEN SATURATION: 99 % | SYSTOLIC BLOOD PRESSURE: 111 MMHG | HEART RATE: 85 BPM | TEMPERATURE: 98 F | HEIGHT: 68 IN | RESPIRATION RATE: 18 BRPM

## 2019-09-20 DIAGNOSIS — R05.9 COUGH: Primary | ICD-10-CM

## 2019-09-20 PROCEDURE — 99213 OFFICE O/P EST LOW 20 MIN: CPT

## 2019-09-20 PROCEDURE — 99214 OFFICE O/P EST MOD 30 MIN: CPT

## 2019-09-20 RX ORDER — BENZONATATE 100 MG/1
100 CAPSULE ORAL 3 TIMES DAILY PRN
Qty: 21 CAPSULE | Refills: 0 | Status: SHIPPED | OUTPATIENT
Start: 2019-09-20 | End: 2020-11-12

## 2019-09-20 NOTE — ED PROVIDER NOTES
Patient Seen in: [facility] In [address] [place] [address]      History   Patient presents with:  Cough/URI    Stated Complaint: cough, sinus pressure, h/a    HPI    Patient complains of cough and sinus pain which have been present for 5 days.   The pa 111/79   Pulse 85   Temp 98.3 °F (36.8 °C) (Oral)   Resp 18   Ht 172.7 cm (5' 8\")   Wt 65.8 kg   SpO2 99%   BMI 22.05 kg/m²         Physical Exam    Patient is awake and alert  Eyes pupils are equal reactive  ENT ears tympanic membranes  normal in appeara

## 2019-09-20 NOTE — ED INITIAL ASSESSMENT (HPI)
Patient reports cold and allergy symptoms since last Sunday. Patient reports she started off with cold symptoms and now has a severe cough. Patient also complains of sinus pressure.

## 2019-12-05 ENCOUNTER — OFFICE VISIT (OUTPATIENT)
Dept: SURGERY | Facility: CLINIC | Age: 44
End: 2019-12-05
Payer: COMMERCIAL

## 2019-12-05 VITALS
BODY MASS INDEX: 26.22 KG/M2 | HEIGHT: 68 IN | DIASTOLIC BLOOD PRESSURE: 84 MMHG | WEIGHT: 173 LBS | RESPIRATION RATE: 16 BRPM | SYSTOLIC BLOOD PRESSURE: 133 MMHG | HEART RATE: 92 BPM

## 2019-12-05 DIAGNOSIS — E78.00 HYPERCHOLESTEREMIA: ICD-10-CM

## 2019-12-05 DIAGNOSIS — E66.3 OVERWEIGHT (BMI 25.0-29.9): ICD-10-CM

## 2019-12-05 DIAGNOSIS — R63.2 INCREASED APPETITE: Primary | ICD-10-CM

## 2019-12-05 DIAGNOSIS — Z51.81 ENCOUNTER FOR THERAPEUTIC DRUG MONITORING: ICD-10-CM

## 2019-12-05 PROCEDURE — 99214 OFFICE O/P EST MOD 30 MIN: CPT | Performed by: INTERNAL MEDICINE

## 2019-12-05 RX ORDER — PHENTERMINE HYDROCHLORIDE 15 MG/1
15 CAPSULE ORAL
Qty: 30 CAPSULE | Refills: 2 | Status: SHIPPED | OUTPATIENT
Start: 2019-12-05 | End: 2020-06-11 | Stop reason: DRUGHIGH

## 2019-12-05 NOTE — PROGRESS NOTES
[address], [address]  Dept: [phone]       Patient:  Braeden Fried  :      1975  MRN:      [id number]    Chief Complaint:  Patient presen 30 tablet 11   • Escitalopram Oxalate 20 MG Oral Tab Take 20 mg by mouth daily.        Allergies:  Gluten Flour     Social History:  Social History    Socioeconomic History      Marital status:       Spouse name: Not on file      Number of children: (Other) Father    • Heart Disorder Mother    • Hypertension Mother    • Lipids Mother    • Other (Other) Mother    • Diabetes Paternal Grandmother    • Colon Cancer Paternal Grandmother    • Other (possible celiac disease) Son    • Colon Cancer Maternal Gr conjunctivae/corneas clear. PERRL, EOM's intact. Fundi benign.   Lungs: clear to auscultation bilaterally  Heart: S1, S2 normal, no murmur, click, rub or gallop, regular rate and rhythm  Abdomen: soft, non-tender; bowel sounds normal; no masses,  no organom Merlin Coats MD

## 2020-03-03 ENCOUNTER — IMAGING SERVICES (OUTPATIENT)
Dept: OTHER | Age: 45
End: 2020-03-03
Attending: OBSTETRICS & GYNECOLOGY

## 2020-03-05 ENCOUNTER — TELEPHONE (OUTPATIENT)
Dept: GASTROENTEROLOGY | Facility: CLINIC | Age: 45
End: 2020-03-05

## 2020-03-14 DIAGNOSIS — E78.2 MIXED HYPERLIPIDEMIA: ICD-10-CM

## 2020-03-15 NOTE — TELEPHONE ENCOUNTER
Please review; protocol failed.  D/t labs  Requested Prescriptions     Pending Prescriptions Disp Refills   • ROSUVASTATIN CALCIUM 5 MG Oral Tab [Pharmacy Med Name: ROSUVASTATIN CALCIUM 5 MG TAB] 30 tablet 5     Sig: TAKE 1 TABLET BY MOUTH EVERY DAY EVERY N

## 2020-03-16 RX ORDER — ROSUVASTATIN CALCIUM 5 MG/1
TABLET, COATED ORAL
Qty: 90 TABLET | Refills: 1 | Status: SHIPPED | OUTPATIENT
Start: 2020-03-16 | End: 2020-10-30

## 2020-06-11 ENCOUNTER — OFFICE VISIT (OUTPATIENT)
Dept: SURGERY | Facility: CLINIC | Age: 45
End: 2020-06-11
Payer: COMMERCIAL

## 2020-06-11 VITALS
BODY MASS INDEX: 27.72 KG/M2 | SYSTOLIC BLOOD PRESSURE: 111 MMHG | DIASTOLIC BLOOD PRESSURE: 83 MMHG | WEIGHT: 182.88 LBS | HEART RATE: 91 BPM | OXYGEN SATURATION: 99 % | HEIGHT: 68 IN

## 2020-06-11 DIAGNOSIS — E78.00 HYPERCHOLESTEREMIA: ICD-10-CM

## 2020-06-11 DIAGNOSIS — E55.9 VITAMIN D DEFICIENCY: ICD-10-CM

## 2020-06-11 DIAGNOSIS — E66.3 OVERWEIGHT (BMI 25.0-29.9): ICD-10-CM

## 2020-06-11 DIAGNOSIS — Z51.81 ENCOUNTER FOR THERAPEUTIC DRUG MONITORING: ICD-10-CM

## 2020-06-11 DIAGNOSIS — R63.2 INCREASED APPETITE: Primary | ICD-10-CM

## 2020-06-11 PROCEDURE — 99214 OFFICE O/P EST MOD 30 MIN: CPT | Performed by: INTERNAL MEDICINE

## 2020-06-11 RX ORDER — PHENTERMINE HYDROCHLORIDE 37.5 MG/1
37.5 TABLET ORAL
Qty: 30 TABLET | Refills: 2 | Status: SHIPPED | OUTPATIENT
Start: 2020-06-11 | End: 2020-10-27

## 2020-06-11 NOTE — PROGRESS NOTES
[address]  Dept: [phone]       Patient:  Hugo Greene  :      1975  MRN:      [id number]    Chief Complaint:  Patient presen • ROSUVASTATIN CALCIUM 5 MG Oral Tab TAKE 1 TABLET BY MOUTH EVERY DAY EVERY NIGHT 90 tablet 1   • benzonatate 100 MG Oral Cap Take 1 capsule (100 mg total) by mouth 3 (three) times daily as needed for cough.  21 capsule 0   • azithromycin ([place] meetings of clubs or organizations: Not on file        Relationship status: Not on file      Intimate partner violence:        Fear of current or ex partner: Not on file        Emotionally abused: Not on file        Physically abused: Not on file        Fo Identify triggers for eating and manage cues and Eat slowly and take 20 to 30 minutes to complete each meal    Exercise Goals Reviewed and Discussed    Needs to start exercising     ROS:    Constitutional: positive for fatigue  Respiratory: negative  Cardi 01/16/2019 08:50 AM    TRIG 79 01/16/2019 08:50 AM       Goals for next month:  1. Keep a food log. 2. Drink 48-64 ounces of non-caloric beverages per day. No fruit juices or regular soda.   3. Increase activity-upper body exercises, walk 10 minutes per [employer]

## 2020-09-27 DIAGNOSIS — E78.2 MIXED HYPERLIPIDEMIA: ICD-10-CM

## 2020-09-27 RX ORDER — ROSUVASTATIN CALCIUM 5 MG/1
TABLET, COATED ORAL
Qty: 30 TABLET | Refills: 5 | OUTPATIENT
Start: 2020-09-27

## 2020-10-27 ENCOUNTER — OFFICE VISIT (OUTPATIENT)
Dept: SURGERY | Facility: CLINIC | Age: 45
End: 2020-10-27
Payer: COMMERCIAL

## 2020-10-27 VITALS
HEIGHT: 68 IN | OXYGEN SATURATION: 98 % | DIASTOLIC BLOOD PRESSURE: 80 MMHG | WEIGHT: 178 LBS | SYSTOLIC BLOOD PRESSURE: 127 MMHG | HEART RATE: 76 BPM | BODY MASS INDEX: 26.98 KG/M2

## 2020-10-27 DIAGNOSIS — E66.3 OVERWEIGHT (BMI 25.0-29.9): ICD-10-CM

## 2020-10-27 DIAGNOSIS — E78.00 HYPERCHOLESTEREMIA: Primary | ICD-10-CM

## 2020-10-27 DIAGNOSIS — E78.2 MIXED HYPERLIPIDEMIA: ICD-10-CM

## 2020-10-27 DIAGNOSIS — F43.9 STRESS: ICD-10-CM

## 2020-10-27 DIAGNOSIS — Z51.81 ENCOUNTER FOR THERAPEUTIC DRUG MONITORING: ICD-10-CM

## 2020-10-27 DIAGNOSIS — R63.2 INCREASED APPETITE: ICD-10-CM

## 2020-10-27 PROCEDURE — 3074F SYST BP LT 130 MM HG: CPT | Performed by: INTERNAL MEDICINE

## 2020-10-27 PROCEDURE — 99214 OFFICE O/P EST MOD 30 MIN: CPT | Performed by: INTERNAL MEDICINE

## 2020-10-27 PROCEDURE — 3008F BODY MASS INDEX DOCD: CPT | Performed by: INTERNAL MEDICINE

## 2020-10-27 PROCEDURE — 3079F DIAST BP 80-89 MM HG: CPT | Performed by: INTERNAL MEDICINE

## 2020-10-27 RX ORDER — PHENTERMINE HYDROCHLORIDE 37.5 MG/1
37.5 TABLET ORAL
Qty: 30 TABLET | Refills: 2 | Status: SHIPPED | OUTPATIENT
Start: 2020-10-27 | End: 2020-11-12

## 2020-10-27 NOTE — PROGRESS NOTES
[address], [address]  Dept: [phone]       Patient:  Brody Bass  :      1975  MRN:      [id number]    Chief Complaint:  Patient presen for cough. 1 Bottle 0   • Ondansetron HCl (ZOFRAN) 4 mg tablet Take 1-2 tablets (4-8 mg total) by mouth every 12 (twelve) hours as needed for Nausea. 30 tablet 11   • Escitalopram Oxalate 20 MG Oral Tab Take 20 mg by mouth daily.        Allergies:  Gluten F EGD  5/16, 11/17     Family History:    Family History   Problem Relation Age of Onset   • Heart Disorder Father    • Cancer Father    • Other (Other) Father    • Heart Disorder Mother    • Hypertension Mother    • Lipids Mother    • Other (Other) Mother are negative    Physical Exam:   General appearance: alert, appears stated age and cooperative  Head: Normocephalic, without obvious abnormality, atraumatic  Eyes: conjunctivae/corneas clear. PERRL, EOM's intact. Fundi benign.   Lungs: clear to auscultation all orders for this visit:    Hypercholesteremia    Increased appetite    Stress    Encounter for therapeutic drug monitoring    Overweight (BMI 25.0-29. 9)          Nithya Shah MD

## 2020-10-28 ENCOUNTER — TELEPHONE (OUTPATIENT)
Dept: SURGERY | Facility: CLINIC | Age: 45
End: 2020-10-28

## 2020-10-28 RX ORDER — TOPIRAMATE 25 MG/1
25 TABLET ORAL EVERY EVENING
Qty: 30 TABLET | Refills: 2 | Status: SHIPPED | OUTPATIENT
Start: 2020-10-28 | End: 2020-11-12

## 2020-10-28 NOTE — TELEPHONE ENCOUNTER
Patient left voicemail asking about another medication that you wanted her to try that was discuss during office visit,she thinks it was topiramate. If it could be send to her [facility] pharmacy.

## 2020-10-30 RX ORDER — ROSUVASTATIN CALCIUM 5 MG/1
TABLET, COATED ORAL
Qty: 30 TABLET | Refills: 5 | Status: SHIPPED | OUTPATIENT
Start: 2020-10-30 | End: 2020-11-02

## 2020-11-02 ENCOUNTER — OFFICE VISIT (OUTPATIENT)
Dept: INTERNAL MEDICINE CLINIC | Facility: CLINIC | Age: 45
End: 2020-11-02
Payer: COMMERCIAL

## 2020-11-02 VITALS
HEART RATE: 90 BPM | DIASTOLIC BLOOD PRESSURE: 81 MMHG | BODY MASS INDEX: 26.92 KG/M2 | SYSTOLIC BLOOD PRESSURE: 114 MMHG | TEMPERATURE: 98 F | WEIGHT: 177.63 LBS | HEIGHT: 68 IN

## 2020-11-02 DIAGNOSIS — Z12.4 SCREENING FOR CERVICAL CANCER: ICD-10-CM

## 2020-11-02 DIAGNOSIS — E78.2 MIXED HYPERLIPIDEMIA: ICD-10-CM

## 2020-11-02 DIAGNOSIS — Z00.00 PHYSICAL EXAM: Primary | ICD-10-CM

## 2020-11-02 PROCEDURE — 3074F SYST BP LT 130 MM HG: CPT | Performed by: PHYSICIAN ASSISTANT

## 2020-11-02 PROCEDURE — 99396 PREV VISIT EST AGE 40-64: CPT | Performed by: PHYSICIAN ASSISTANT

## 2020-11-02 PROCEDURE — 3008F BODY MASS INDEX DOCD: CPT | Performed by: PHYSICIAN ASSISTANT

## 2020-11-02 PROCEDURE — 3079F DIAST BP 80-89 MM HG: CPT | Performed by: PHYSICIAN ASSISTANT

## 2020-11-02 RX ORDER — ROSUVASTATIN CALCIUM 5 MG/1
5 TABLET, COATED ORAL NIGHTLY
Qty: 30 TABLET | Refills: 5 | Status: SHIPPED | OUTPATIENT
Start: 2020-11-02 | End: 2021-02-03

## 2020-11-02 NOTE — PROGRESS NOTES
HPI:    Patient ID: Jyothi Shaffer is a 54year old female. HPI  Pt presents for physical exam, doing well at this time no complaints. Due for pap  Mammogram done 3/2020  Review of Systems   Constitutional: Negative. HENT: Negative.     Eyes: Ne Medical History:   Diagnosis Date   • Anxiety state, unspecified     Per NG: Anxiety-panic attacks   • Celiac disease    • Constipation    • Hyperlipidemia    • Pregnancy     Per NG: vaginal deliveries times 3   • S/P cystoscopy 2014    Per NG: Stent place breath sounds normal. No respiratory distress. She has no wheezes. Abdominal: Soft. There is no abdominal tenderness. Lymphadenopathy:     She has no cervical adenopathy. Neurological: She is alert and oriented to person, place, and time.    Skin: Ski

## 2020-11-03 ENCOUNTER — LAB ENCOUNTER (OUTPATIENT)
Dept: LAB | Age: 45
End: 2020-11-03
Attending: PHYSICIAN ASSISTANT
Payer: COMMERCIAL

## 2020-11-03 ENCOUNTER — TELEPHONE (OUTPATIENT)
Dept: INTERNAL MEDICINE CLINIC | Facility: CLINIC | Age: 45
End: 2020-11-03

## 2020-11-03 DIAGNOSIS — Z00.00 PHYSICAL EXAM: ICD-10-CM

## 2020-11-03 PROCEDURE — 80053 COMPREHEN METABOLIC PANEL: CPT

## 2020-11-03 PROCEDURE — 81001 URINALYSIS AUTO W/SCOPE: CPT

## 2020-11-03 PROCEDURE — 80061 LIPID PANEL: CPT

## 2020-11-03 PROCEDURE — 85025 COMPLETE CBC W/AUTO DIFF WBC: CPT

## 2020-11-03 PROCEDURE — 84443 ASSAY THYROID STIM HORMONE: CPT

## 2020-11-03 PROCEDURE — 82306 VITAMIN D 25 HYDROXY: CPT

## 2020-11-03 PROCEDURE — 36415 COLL VENOUS BLD VENIPUNCTURE: CPT

## 2020-11-03 RX ORDER — ONDANSETRON 4 MG/1
TABLET, FILM COATED ORAL
Qty: 90 TABLET | Refills: 1 | Status: SHIPPED | OUTPATIENT
Start: 2020-11-03 | End: 2020-11-12

## 2020-11-03 NOTE — TELEPHONE ENCOUNTER
Left message for patient that prescription was sent to pharmacy and to call back with any questions or concerns.

## 2020-11-03 NOTE — TELEPHONE ENCOUNTER
Pt seen if office yesterday by MARY Villalobos and discussed getting a new Rx for Zofran. No order send to pharmacy.      Order pended for review/approval.

## 2020-11-04 ENCOUNTER — TELEPHONE (OUTPATIENT)
Dept: INTERNAL MEDICINE CLINIC | Facility: CLINIC | Age: 45
End: 2020-11-04

## 2020-11-05 ENCOUNTER — TELEPHONE (OUTPATIENT)
Dept: INTERNAL MEDICINE CLINIC | Facility: CLINIC | Age: 45
End: 2020-11-05

## 2020-11-05 RX ORDER — HYDROXYZINE HYDROCHLORIDE 25 MG/1
25 TABLET, FILM COATED ORAL 3 TIMES DAILY
Qty: 30 TABLET | Refills: 0 | Status: SHIPPED | OUTPATIENT
Start: 2020-11-05 | End: 2020-11-12

## 2020-11-05 NOTE — TELEPHONE ENCOUNTER
Informed patient of Dr. Samia Liu advice as per below. Sent Hydroxyzine to patient's pharmacy. Patient stated if she's not better by tomorrow, she will come in for a visit.     Lab stated they cannot add on from last draw on 11/3 as there are too many labs

## 2020-11-05 NOTE — TELEPHONE ENCOUNTER
No.start on hydroxyzine 25 mg 3 times daily for 10 days. I need labs-ordered at this time, this can be added to the blood test drawn already, please call the lab to check if they can added on to the labs that they brandan yesterday.   Needs to be seen may add

## 2020-11-05 NOTE — TELEPHONE ENCOUNTER
Message # [phone]          11/04/2020 08:41p   [JADEN]  To:  From:  ERICA Ryan MD:  Phone#:  ----------------------------------------------------------------------  Charlotte Daniels   [phone]  RE ITCHING ALL OVER BODY &  VERY NAUSUS  FOR 3 DAYS [phone]

## 2020-11-05 NOTE — TELEPHONE ENCOUNTER
Patient reports spoke with Dr Jax Cast yesterday regarding symptoms of itching all over her body.  Was prescribed prednisone 5mg, reports took first dose at 9:30pm and a 2nd dose at 4am because the itching was bad and she could not sleep, she also took a dose

## 2020-11-06 ENCOUNTER — TELEPHONE (OUTPATIENT)
Dept: GASTROENTEROLOGY | Facility: CLINIC | Age: 45
End: 2020-11-06

## 2020-11-06 ENCOUNTER — LAB ENCOUNTER (OUTPATIENT)
Dept: LAB | Age: 45
End: 2020-11-06
Attending: INTERNAL MEDICINE
Payer: COMMERCIAL

## 2020-11-06 DIAGNOSIS — L29.9 PRURITUS: ICD-10-CM

## 2020-11-06 PROCEDURE — 36415 COLL VENOUS BLD VENIPUNCTURE: CPT

## 2020-11-06 PROCEDURE — 86160 COMPLEMENT ANTIGEN: CPT

## 2020-11-06 PROCEDURE — 86140 C-REACTIVE PROTEIN: CPT

## 2020-11-06 PROCEDURE — 86038 ANTINUCLEAR ANTIBODIES: CPT

## 2020-11-06 PROCEDURE — 85025 COMPLETE CBC W/AUTO DIFF WBC: CPT

## 2020-11-06 PROCEDURE — 86162 COMPLEMENT TOTAL (CH50): CPT

## 2020-11-06 PROCEDURE — 86039 ANTINUCLEAR ANTIBODIES (ANA): CPT

## 2020-11-06 PROCEDURE — 83615 LACTATE (LD) (LDH) ENZYME: CPT

## 2020-11-06 PROCEDURE — 80076 HEPATIC FUNCTION PANEL: CPT

## 2020-11-06 PROCEDURE — 85652 RBC SED RATE AUTOMATED: CPT

## 2020-11-06 NOTE — TELEPHONE ENCOUNTER
Pt has appt today at 10:30am but states she is having an allergic reaction. Pt states she has spoken with her PCP and will be seen by PCP this morning. Pt requesting to reschedule appt sooner than first available.  Please call [phone]

## 2020-11-06 NOTE — TELEPHONE ENCOUNTER
Patient states the \"itching\" has decreased and doesn't feel as bad. She will schedule an appointment today to have blood work done. Patient is asking if you still need to see.   Please advise

## 2020-11-09 ENCOUNTER — TELEPHONE (OUTPATIENT)
Dept: INTERNAL MEDICINE CLINIC | Facility: CLINIC | Age: 45
End: 2020-11-09

## 2020-11-10 ENCOUNTER — PATIENT MESSAGE (OUTPATIENT)
Dept: INTERNAL MEDICINE CLINIC | Facility: CLINIC | Age: 45
End: 2020-11-10

## 2020-11-11 ENCOUNTER — TELEPHONE (OUTPATIENT)
Dept: INTERNAL MEDICINE CLINIC | Facility: CLINIC | Age: 45
End: 2020-11-11

## 2020-11-11 NOTE — TELEPHONE ENCOUNTER
Acute encounter created and routed to RN staff to please call patient to relay results. Also routed acute encounter to Dr. Josh Edwards for review. Response sent to patient with this update and also advised she may also call our office to further discuss.

## 2020-11-11 NOTE — TELEPHONE ENCOUNTER
She would like a call back as soon as possible. She is asking if the On [employer] nutrition can cause the JOCELYN positive test.  She stated she does not have any symptoms of lupus.   She stated her JOCELYN is always positive since her son for she has celiac dis

## 2020-11-11 NOTE — TELEPHONE ENCOUNTER
Message # [phone]         2020 08:42p   [KETURAHB]  To:  From:  ERICA Ryan MD:  Phone#:  ----------------------------------------------------------------------  PT Jessica Tijerina [phone],  73, PT HAS CONCERNS RE TEST RESULTS Paged at n

## 2020-11-11 NOTE — TELEPHONE ENCOUNTER
Please see mychart message from patient below. RN on site to please call patient to review test results. Message routed to PCP as well at this time for review.      Viewed by Jyothi Shaffer on 11/9/2020 11:24 PM  Written by Beverley Buckley MD on 11/9

## 2020-11-11 NOTE — TELEPHONE ENCOUNTER
----- Message from Jane Sesay sent at 11/10/2020  5:46 PM CST -----  Regarding: Test Results Question  Contact: [phone]  This is ridiculous. I have been trying to speak with Dr. Oliva Spangler or one of her nurses since yesterday.  I have emailed twice

## 2020-11-11 NOTE — TELEPHONE ENCOUNTER
From: Bismark Nair  To: Cruz Beatty MD  Sent: 11/10/2020 5:46 PM CST  Subject: Test Results Question    This is ridiculous. I have been trying to speak with Dr. Staci Vidales or one of her nurses since yesterday.  I have emailed twice and left two teleph

## 2020-11-12 ENCOUNTER — OFFICE VISIT (OUTPATIENT)
Dept: INTEGRATIVE MEDICINE | Facility: CLINIC | Age: 45
End: 2020-11-12
Payer: COMMERCIAL

## 2020-11-12 ENCOUNTER — LAB ENCOUNTER (OUTPATIENT)
Dept: LAB | Age: 45
End: 2020-11-12
Attending: FAMILY MEDICINE
Payer: COMMERCIAL

## 2020-11-12 VITALS
BODY MASS INDEX: 26.58 KG/M2 | HEIGHT: 68 IN | SYSTOLIC BLOOD PRESSURE: 118 MMHG | DIASTOLIC BLOOD PRESSURE: 86 MMHG | WEIGHT: 175.38 LBS | OXYGEN SATURATION: 99 % | HEART RATE: 92 BPM

## 2020-11-12 DIAGNOSIS — K90.0 CELIAC DISEASE: ICD-10-CM

## 2020-11-12 DIAGNOSIS — E66.3 OVERWEIGHT (BMI 25.0-29.9): ICD-10-CM

## 2020-11-12 DIAGNOSIS — K59.00 CONSTIPATION, UNSPECIFIED CONSTIPATION TYPE: Primary | ICD-10-CM

## 2020-11-12 DIAGNOSIS — R76.8 POSITIVE ANA (ANTINUCLEAR ANTIBODY): ICD-10-CM

## 2020-11-12 DIAGNOSIS — Z97.5 IUD (INTRAUTERINE DEVICE) IN PLACE: ICD-10-CM

## 2020-11-12 DIAGNOSIS — R63.5 WEIGHT GAIN: ICD-10-CM

## 2020-11-12 DIAGNOSIS — K59.00 CONSTIPATION, UNSPECIFIED CONSTIPATION TYPE: ICD-10-CM

## 2020-11-12 DIAGNOSIS — R14.0 BLOATING: ICD-10-CM

## 2020-11-12 PROCEDURE — 86628 CANDIDA ANTIBODY: CPT

## 2020-11-12 PROCEDURE — 86431 RHEUMATOID FACTOR QUANT: CPT

## 2020-11-12 PROCEDURE — 36415 COLL VENOUS BLD VENIPUNCTURE: CPT

## 2020-11-12 PROCEDURE — 86225 DNA ANTIBODY NATIVE: CPT

## 2020-11-12 PROCEDURE — 86200 CCP ANTIBODY: CPT

## 2020-11-12 PROCEDURE — 83516 IMMUNOASSAY NONANTIBODY: CPT

## 2020-11-12 PROCEDURE — 83876 ASSAY MYELOPEROXIDASE: CPT

## 2020-11-12 PROCEDURE — 3079F DIAST BP 80-89 MM HG: CPT | Performed by: FAMILY MEDICINE

## 2020-11-12 PROCEDURE — 86235 NUCLEAR ANTIGEN ANTIBODY: CPT

## 2020-11-12 PROCEDURE — 99204 OFFICE O/P NEW MOD 45 MIN: CPT | Performed by: FAMILY MEDICINE

## 2020-11-12 PROCEDURE — 99072 ADDL SUPL MATRL&STAF TM PHE: CPT | Performed by: FAMILY MEDICINE

## 2020-11-12 PROCEDURE — 3008F BODY MASS INDEX DOCD: CPT | Performed by: FAMILY MEDICINE

## 2020-11-12 PROCEDURE — 3074F SYST BP LT 130 MM HG: CPT | Performed by: FAMILY MEDICINE

## 2020-11-12 NOTE — PATIENT INSTRUCTIONS
I have complete hiram in the body's ability to heal and transform. The products and items listed below (the “Products”)  and their claims have not been evaluated by the Food and Drug Administration.  Dietary products are not intended to treat, prevent, m daily for 2 weeks, then increase to 1 capsule twice daily. After another 2 weeks increase to 1 capsule three times per day. Where to Find: www[employer]  Why: Aids in the repair and function of the gut mucosa and epithelial junctions.

## 2020-11-12 NOTE — PROGRESS NOTES
Grant Serrato is a 54year old female. Patient presents with: Integrative Medicine Consult: lab results - GI isses - weight gain       HPI:     Has a lot of GI issues, gets bloating, gas. Has celiac disease, diagnosed 6 years.  Diagnosed after sh cystoscopy   • Urolithiasis    • Vertigo 2013       CURRENT MEDICATIONS:     Current Outpatient Medications   Medication Sig Dispense Refill   • Ondansetron HCl (ZOFRAN) 4 mg tablet Take 1 tablet (4 mg total) by mouth every 8 (eight) hours as needed for Na on file    Other Topics      Concerns:        Not on file    Social History Narrative      3 children      SURGICAL HISTORY:     Past Surgical History:   Procedure Laterality Date   • Egd  5/16, 11/17       PHYSICAL EXAM:      11/12/20  1210   BP: 118/86 Given further recommendations as below    Orders Placed This Visit:  No orders of the defined types were placed in this encounter.     Orders Placed This Encounter      **ACUPUNCTURE THERAPY ([place] Location) - INTERNAL REFERRAL**      Patient Instruction taking 1.5 tsp twice daily. Mix in liquid of choice. Reduce to lowest dose tolerated if any reactions occur. Buy it online at [employer] or at the [employer].       Permeable Gut Formula by [facility] [employer]    Directions: 1 capsule by mo a copy of your results may be sent to you prior to your visit ONLY if you are on MyChart. Return in about 6 weeks (around 12/24/2020) for Integrative Medicine - Established (30 min).     Patient affirmed understanding of plan and all questions w

## 2020-11-12 NOTE — TELEPHONE ENCOUNTER
Relayed dr message ,Pt very upset because Dr did not call her, wants to change Dr , feels this is unprofessional passing messages back/forth , feels if she has Lupus should be talked by Dr and not Nurses

## 2020-11-12 NOTE — TELEPHONE ENCOUNTER
She needs to stop the preworkout and optimal nutrition supplements. The creatine and caffeine can both cause palpitations, hyperstimulation and caffeine intoxication. This cannot cause any of the abnormalities in the lab however.

## 2020-11-17 NOTE — TELEPHONE ENCOUNTER
Patient returned call, messages below relayed. She verbalized understanding and states she is going to seek care elsewhere. Dr. Mcmillan Parents.

## 2020-12-09 RX ORDER — ONDANSETRON 4 MG/1
TABLET, FILM COATED ORAL
Qty: 90 TABLET | Refills: 1 | OUTPATIENT
Start: 2020-12-09

## 2020-12-19 RX ORDER — HYDROXYZINE HYDROCHLORIDE 25 MG/1
25 TABLET, FILM COATED ORAL 3 TIMES DAILY
Qty: 30 TABLET | Refills: 0 | OUTPATIENT
Start: 2020-12-19 | End: 2020-12-29

## 2021-01-14 ENCOUNTER — TELEPHONE (OUTPATIENT)
Dept: GASTROENTEROLOGY | Facility: CLINIC | Age: 46
End: 2021-01-14

## 2021-01-14 NOTE — TELEPHONE ENCOUNTER
Jenine Pallas and GI RNs,     Ms Nena Jesus is an established patient with a complex combination of celiac disease and likely small bowel bacterial overgrowth/IBS. I prescribed rifaximin once before in Oct 2018.     She initially had an appointment for 11/6/2020 which

## 2021-01-19 ENCOUNTER — OFFICE VISIT (OUTPATIENT)
Dept: SURGERY | Facility: CLINIC | Age: 46
End: 2021-01-19
Payer: COMMERCIAL

## 2021-01-19 VITALS
DIASTOLIC BLOOD PRESSURE: 80 MMHG | WEIGHT: 181 LBS | HEART RATE: 75 BPM | BODY MASS INDEX: 27.43 KG/M2 | OXYGEN SATURATION: 98 % | SYSTOLIC BLOOD PRESSURE: 120 MMHG | HEIGHT: 68 IN

## 2021-01-19 DIAGNOSIS — E78.00 HYPERCHOLESTEREMIA: Primary | ICD-10-CM

## 2021-01-19 DIAGNOSIS — E66.3 OVERWEIGHT (BMI 25.0-29.9): ICD-10-CM

## 2021-01-19 DIAGNOSIS — Z51.81 ENCOUNTER FOR THERAPEUTIC DRUG MONITORING: ICD-10-CM

## 2021-01-19 DIAGNOSIS — F43.9 STRESS: ICD-10-CM

## 2021-01-19 DIAGNOSIS — R63.2 INCREASED APPETITE: ICD-10-CM

## 2021-01-19 PROCEDURE — 3008F BODY MASS INDEX DOCD: CPT | Performed by: INTERNAL MEDICINE

## 2021-01-19 PROCEDURE — 99214 OFFICE O/P EST MOD 30 MIN: CPT | Performed by: INTERNAL MEDICINE

## 2021-01-19 PROCEDURE — 3079F DIAST BP 80-89 MM HG: CPT | Performed by: INTERNAL MEDICINE

## 2021-01-19 PROCEDURE — 3074F SYST BP LT 130 MM HG: CPT | Performed by: INTERNAL MEDICINE

## 2021-01-19 RX ORDER — LISDEXAMFETAMINE DIMESYLATE 40 MG/1
40 CAPSULE ORAL DAILY
COMMUNITY
Start: 2020-12-08 | End: 2021-01-19

## 2021-01-19 RX ORDER — PHENTERMINE HYDROCHLORIDE 37.5 MG/1
37.5 TABLET ORAL
Qty: 30 TABLET | Refills: 2 | Status: SHIPPED | OUTPATIENT
Start: 2021-01-19 | End: 2021-04-06

## 2021-01-19 RX ORDER — TOPIRAMATE 25 MG/1
TABLET ORAL
COMMUNITY
Start: 2020-12-29 | End: 2021-04-06

## 2021-01-19 NOTE — PROGRESS NOTES
[address], [address]  Dept: [phone]       Patient:  Gerald Faith  :      1975  MRN:      [id number]    Chief Complaint:  Patient presen Marital status:       Spouse name: Not on file      Number of children: Not on file      Years of education: Not on file      Highest education level: Not on file    Occupational History      Not on file    Social Needs      Financial resource strai • Other (possible celiac disease) Son    • Colon Cancer Maternal Grandmother        Food Journal  · Reviewed and Discussed:       · Patient has a Food Journal?: yes   · Patient is reading nutrition labels?   yes  · Average Caloric Intake:     · Average C regular rate and rhythm  Abdomen: soft, non-tender; bowel sounds normal; no masses,  no organomegaly  Extremities: extremities normal, atraumatic, no cyanosis or edema  Pulses: 2+ and symmetric  Skin: Skin color, texture, turgor normal. No rashes or lesion

## 2021-01-21 DIAGNOSIS — E78.2 MIXED HYPERLIPIDEMIA: ICD-10-CM

## 2021-01-22 RX ORDER — ROSUVASTATIN CALCIUM 5 MG/1
TABLET, COATED ORAL
Qty: 30 TABLET | Refills: 5 | OUTPATIENT
Start: 2021-01-22

## 2021-01-26 NOTE — TELEPHONE ENCOUNTER
Dr Jasper Domingo,    I finally was able to reach [place].     She wants to keep her appt with you    She is always bloated, constipated and nauseated    He has a bowel movement once a week    She has tried MiraLax, Metamucil and Calm in the past    She recently co

## 2021-02-01 ENCOUNTER — PATIENT MESSAGE (OUTPATIENT)
Dept: GASTROENTEROLOGY | Facility: CLINIC | Age: 46
End: 2021-02-01

## 2021-02-01 NOTE — TELEPHONE ENCOUNTER
From: Alhaji Luna  To: Randy Peng MD  Sent: 2/1/2021 6:52 AM CST  Subject: Other    Hi!! Does Dr. Kinga Lester have any other openings this weds or Thursday? ? I have to get my son into a doctors appointment and of course the only open

## 2021-02-11 NOTE — TELEPHONE ENCOUNTER
I spoke to the pt. appt scheduled.     Date, time and ADO location verified    Future Appointments   Date Time Provider Melissa Rodriguez   3/5/2021  1:30 PM Theola Krabbe, MD [facility] EC ADO   3/24/2021  3:00 PM Avtar Dimas [facility] MED

## 2021-02-23 NOTE — TELEPHONE ENCOUNTER
Current Outpatient Medications:     •  Ondansetron HCl (ZOFRAN) 4 mg tablet, Take 1 tablet (4 mg total) by mouth every 8 (eight) hours as needed for Nausea., Disp: 20 tablet, Rfl: 2

## 2021-02-26 RX ORDER — ONDANSETRON 4 MG/1
4 TABLET, FILM COATED ORAL EVERY 8 HOURS PRN
Qty: 20 TABLET | Refills: 2 | OUTPATIENT
Start: 2021-02-26

## 2021-04-01 ENCOUNTER — TELEPHONE (OUTPATIENT)
Dept: GASTROENTEROLOGY | Facility: CLINIC | Age: 46
End: 2021-04-01

## 2021-04-01 DIAGNOSIS — R19.4 CHANGE IN BOWEL HABITS: Primary | ICD-10-CM

## 2021-04-01 DIAGNOSIS — Z83.71 FAMILY HISTORY OF COLONIC POLYPS: ICD-10-CM

## 2021-04-01 DIAGNOSIS — Z80.0 FAMILY HISTORY OF COLON CANCER: ICD-10-CM

## 2021-04-01 RX ORDER — POLYETHYLENE GLYCOL 3350, SODIUM CHLORIDE, SODIUM BICARBONATE, POTASSIUM CHLORIDE 420; 11.2; 5.72; 1.48 G/4L; G/4L; G/4L; G/4L
POWDER, FOR SOLUTION ORAL
Qty: 1 BOTTLE | Refills: 0 | Status: SHIPPED | OUTPATIENT
Start: 2021-04-01 | End: 2021-04-06

## 2021-04-01 NOTE — TELEPHONE ENCOUNTER
Patient contacted, verified and message from Dr. Pinky Tellez given. Patient is scheduled for a colonoscopy for 1st available on  2021 but is asking if she can be added on sooner please. Also, patient is agreeable to trying either Linzess or Trulance.

## 2021-04-01 NOTE — TELEPHONE ENCOUNTER
GI RN's - please see Dr. Maura Ornelas note below in regards to different options for pt's ongoing constipation. Pt has already been scheduled for her CLN procedure. Thank you!

## 2021-04-01 NOTE — TELEPHONE ENCOUNTER
Patient states she is experiencing constipation,bloating,abdominal pain (none right now) and nausea on and off x 2 years but getting worse in the last month. Last bowel movement was1 week ago. Denies blood in stool or abdominal pain at this time.   Binu also a possibility. Severe stressors in her life at present. Severe health problems and her son also concerning for celiac disease.     EGD examination with biopsies 4/28/2016 consistent but not conclusive for celiac disease.   Seen for further evaluation grandmother  · Discuss and schedule colonoscopy examination next time.           Return for follow-up in 2 months.

## 2021-04-01 NOTE — TELEPHONE ENCOUNTER
Scheduled for:  Colonoscopy - 83482  Provider Name:  Dr. Souleymane Roberto  Date:  5/26/21  Location:  [facility]  Sedation:  MAC  Time:  Approx 1:00 pm (pt is aware that [facility] [address] will call with arrival time)  Prep:  Golytely, Prep instructions were given to pt over the phone, p

## 2021-04-01 NOTE — TELEPHONE ENCOUNTER
GI RNs --    Please call Ms. Sesay to advise that yes we should proceed with a colonoscopy examination. I would recommend outpatient center/[facility] if she can make a Wednesday or Thursday, better experience and safer for her.     If she wishes, we should latonia

## 2021-04-01 NOTE — TELEPHONE ENCOUNTER
GI RNs and schedulers,    See phone call note below.         GI schedulers –    Please call Ms Nena Jesus to schedule colonoscopy exam at [facility] ([facility] [address]    BMI Readings from Last 1 Encounters:  01/19/21 : 27.52 kg/m²     MAC anest

## 2021-04-01 NOTE — TELEPHONE ENCOUNTER
Only way to get this colonoscopy completed sooner would be to [facility], extend my day at [facility]. Nehal Vasquez or Diane, please call the schedulers at [facility] to ask if that is possible.     Prescription sent in today for Linzess, please call

## 2021-04-02 NOTE — TELEPHONE ENCOUNTER
CBLM in regards to procedure. Please transfer to Susanna Correa at ext [phone] for possible rescheduling. HOLD placed on 4/14/21.

## 2021-04-02 NOTE — TELEPHONE ENCOUNTER
Schedulers:  Please see below message from Dr. Maritza Mandel about extending day at [facility] to get patient's procedure scheduled at a sooner date than it is currently scheduled. I submitted a prior authorization request through [employer] today for the [facility]

## 2021-04-06 NOTE — TELEPHONE ENCOUNTER
CBLM in regards to procedure. This is the 2nd attempt to reach out to the pt. Please transfer to Dayan Vargas at ext [phone] for rescheduling.

## 2021-04-07 DIAGNOSIS — F43.9 STRESS: ICD-10-CM

## 2021-04-07 RX ORDER — HYDROXYZINE HYDROCHLORIDE 25 MG/1
25 TABLET, FILM COATED ORAL 3 TIMES DAILY
Qty: 30 TABLET | Refills: 0 | OUTPATIENT
Start: 2021-04-07 | End: 2021-04-17

## 2021-04-08 RX ORDER — SODIUM, POTASSIUM,MAG SULFATES 17.5-3.13G
SOLUTION, RECONSTITUTED, ORAL ORAL
Qty: 1 BOTTLE | Refills: 0 | Status: SHIPPED | OUTPATIENT
Start: 2021-04-08 | End: 2021-09-17

## 2021-04-08 NOTE — TELEPHONE ENCOUNTER
Thanks [email or web address]. [facility]  That would be better than MiraLAX as she has chronic constipation. If the Suprep not covered and if she is not willing to pay cash for the Suprep, okay to substitute the MiraLAX prep.

## 2021-04-08 NOTE — TELEPHONE ENCOUNTER
CBLM in regards to pt's prep. Detailed message left on pt's VM box. Nothing further needed.  TE closed

## 2021-04-08 NOTE — TELEPHONE ENCOUNTER
Dr. Flores Listen - please place order for an alternative prep for pt or advise if Miralax/Gatorade if is ok to use. Pt's pharmacy does not have Golytely in stock. Routed HP. Pt has been rescheduled to 4/14/21. Thank you!

## 2021-04-08 NOTE — TELEPHONE ENCOUNTER
Rescheduled for:  Colonoscopy - 40803  Provider Name:  Dr. David Correa  Date:  FROM - 5/26/21                      TO - 4/14/21  Location:  [facility]  Sedation:  MAC  Time:  FROM - 1:00 pm                 TO - Approx 3:15 pm (pt is aware that [facility] [address] will call with Emerald Healy

## 2021-04-10 RX ORDER — ONDANSETRON 4 MG/1
4 TABLET, FILM COATED ORAL EVERY 8 HOURS PRN
Qty: 20 TABLET | Refills: 2 | OUTPATIENT
Start: 2021-04-10

## 2021-04-11 ENCOUNTER — LAB REQUISITION (OUTPATIENT)
Dept: LAB | Facility: HOSPITAL | Age: 46
End: 2021-04-11
Payer: COMMERCIAL

## 2021-04-11 DIAGNOSIS — Z01.818 ENCOUNTER FOR OTHER PREPROCEDURAL EXAMINATION: ICD-10-CM

## 2021-04-13 ENCOUNTER — TELEPHONE (OUTPATIENT)
Dept: GASTROENTEROLOGY | Facility: CLINIC | Age: 46
End: 2021-04-13

## 2021-04-13 ENCOUNTER — PATIENT MESSAGE (OUTPATIENT)
Dept: GASTROENTEROLOGY | Facility: CLINIC | Age: 46
End: 2021-04-13

## 2021-04-13 NOTE — TELEPHONE ENCOUNTER
From: Kay Bell  To: Jennifer Thomas MD  Sent: 4/13/2021 3:51 PM CDT  Subject: Other    Important!!! Mey Rojas-    I just finally read through all of the instructions for what I need to do before the colonoscopy tomorrow.  Of course I just

## 2021-04-13 NOTE — TELEPHONE ENCOUNTER
Pt states that she had 1/2 omelet with vegetables in it and 2 almond milk lattes this morning. Attempted to transfer/RN on another call. Please call.

## 2021-04-13 NOTE — TELEPHONE ENCOUNTER
Suly Adame called back to let me know she read through the instructions.     She took a phentermine tablet  over the weekend and one yesterday

## 2021-04-13 NOTE — TELEPHONE ENCOUNTER
Dr Cathrine Cranker,    Pt did not know that she was supposed to follow prep instructions for two days prior to her procedure     Mariluz did go over the instructions with her and sent them to     She went out for breakfast this morning and ate an omelet with s

## 2021-04-13 NOTE — TELEPHONE ENCOUNTER
I spoke to the pt and told her she may proceed with the colonoscopy tomorrow. Reminded to drink plenty of clear liquids up until 3 hours prior to her procedure    She will start drinking the prep at 5 pm tonight    Her procedure is at 3:45 pm tomorrow.  She

## 2021-04-13 NOTE — TELEPHONE ENCOUNTER
Should be fine. Please remind Ms. Sesay to drink the whole prep, as well as plenty of clear liquids along with it.

## 2021-04-13 NOTE — TELEPHONE ENCOUNTER
Dr. Steven Marcelino - pt had to be canceled due to not reading her medication hold instructions or her diet restrictions prior to her procedure. Prep instructions were discussed both over the phone & sent via [address]     Pt is anxious to be seen & does not want to

## 2021-04-13 NOTE — TELEPHONE ENCOUNTER
This just keeps getting better. Unfortunately, I think Anesthesia is pretty strict about the phentermine. She will need to reschedule.

## 2021-04-13 NOTE — TELEPHONE ENCOUNTER
Hailey Gibson,    Can you please cancel this appt in Case Tabs?     I already spoke to [place] @ the [facility] and she cancelled the procedure but still needs it cancelled in Case Tabs    Thank you     Also pt will need to be rescheduled

## 2021-04-14 ENCOUNTER — PATIENT MESSAGE (OUTPATIENT)
Dept: GASTROENTEROLOGY | Facility: CLINIC | Age: 46
End: 2021-04-14

## 2021-04-14 NOTE — TELEPHONE ENCOUNTER
From: Alhaji Luna  To: Randy Peng MD  Sent: 4/14/2021 8:42 AM CDT  Subject: Other    Hi- I am so upset that I could not do my colonoscopy today due to miscommunication.  I know one of the nurses was going to reach out to see if I coul

## 2021-04-21 NOTE — TELEPHONE ENCOUNTER
Pt is rescheduled for her colonoscopy.  See TE from 04/13/21    Future Appointments   Date Time Provider Melissa Jennifer   4/29/2021  2:15 PM CAITLYN, PROCEDURE [id number] None   5/7/2021 11:30 AM Osvaldo Justin MD TA GASTRO [facility]G TP   5/24/2021 11:00 AM

## 2021-04-21 NOTE — TELEPHONE ENCOUNTER
Scheduled for:  Colonoscopy - 97841  Provider Name:  Dr. Moira Barbosa  Date:  4/29/21  Location:  [facility]  Sedation:  MAC  Time:  Approx 2:15 pm (pt is aware that De[facility] [address] will call with arrival time)  Prep:  Golytely/Other, Prep instructions were given to pt over the ph

## 2021-04-29 ENCOUNTER — LAB REQUISITION (OUTPATIENT)
Dept: LAB | Facility: HOSPITAL | Age: 46
End: 2021-04-29
Payer: COMMERCIAL

## 2021-04-29 ENCOUNTER — TELEPHONE (OUTPATIENT)
Dept: GASTROENTEROLOGY | Facility: CLINIC | Age: 46
End: 2021-04-29

## 2021-04-29 ENCOUNTER — SURGERY CENTER DOCUMENTATION (OUTPATIENT)
Dept: SURGERY | Age: 46
End: 2021-04-29

## 2021-04-29 DIAGNOSIS — Z80.0 FAMILY HISTORY OF MALIGNANT NEOPLASM OF DIGESTIVE ORGANS: ICD-10-CM

## 2021-04-29 DIAGNOSIS — R19.4 CHANGE IN BOWEL HABIT: ICD-10-CM

## 2021-04-29 PROCEDURE — 88305 TISSUE EXAM BY PATHOLOGIST: CPT | Performed by: INTERNAL MEDICINE

## 2021-04-29 RX ORDER — ONDANSETRON 4 MG/1
4 TABLET, ORALLY DISINTEGRATING ORAL EVERY 8 HOURS PRN
Qty: 60 TABLET | Refills: 5 | Status: SHIPPED | OUTPATIENT
Start: 2021-04-29 | End: 2021-09-17

## 2021-04-29 NOTE — PROCEDURES
[facility]      COLONOSCOPY PROCEDURE REPORT     DATE OF PROCEDURE:  4/29/2021     PCP: Norman Paiz MD     PREOPERATIVE DIAGNOSIS:  family history of colon polyps, family history colon cancer, change in bowel habits     POSTOPERAT prevent bleeding

## 2021-04-29 NOTE — TELEPHONE ENCOUNTER
Reassuring colonoscopy examination today. No colitis/ileitis; random biopsies obtained of terminal ileum. After the exam, we discussed recent frustration with severe abdominal bloating, constipation.     Just starting trial of Linzess laxative 145 mcg d

## 2021-04-30 ENCOUNTER — LAB REQUISITION (OUTPATIENT)
Dept: LAB | Facility: HOSPITAL | Age: 46
End: 2021-04-30
Payer: COMMERCIAL

## 2021-04-30 DIAGNOSIS — Z80.0 FAMILY HISTORY OF MALIGNANT NEOPLASM OF DIGESTIVE ORGANS: ICD-10-CM

## 2021-04-30 DIAGNOSIS — R19.4 CHANGE IN BOWEL HABIT: ICD-10-CM

## 2021-05-18 ENCOUNTER — TELEPHONE (OUTPATIENT)
Dept: GASTROENTEROLOGY | Facility: CLINIC | Age: 46
End: 2021-05-18

## 2021-05-18 ENCOUNTER — PATIENT MESSAGE (OUTPATIENT)
Dept: GASTROENTEROLOGY | Facility: CLINIC | Age: 46
End: 2021-05-18

## 2021-05-18 NOTE — TELEPHONE ENCOUNTER
Results letter mailed to patient per Dr. Brian Acuna recall entered for repeat in 5 yrs,4/29/2026  Colon done in 4/29/2021  HM Updated and Patient Outreach was placed for Colon recall. Chris Garcia MD  P Em Gi Clinical Staff  GI RNs - 1.  Pl

## 2021-05-19 NOTE — TELEPHONE ENCOUNTER
From: Saúl Reynolds  To: Lashell Corea MD  Sent: 5/18/2021 9:38 AM CDT  Subject: Visit Jailyn Norton    Thanks for the response! I think a repeated endoscopy might be a good idea. I’m just curious.     Also- the Doctor Evidence

## 2021-06-05 DIAGNOSIS — F43.9 STRESS: ICD-10-CM

## 2021-07-21 ENCOUNTER — IMAGING SERVICES (OUTPATIENT)
Dept: OTHER | Age: 46
End: 2021-07-21
Attending: OBSTETRICS & GYNECOLOGY

## 2021-08-25 ENCOUNTER — APPOINTMENT (OUTPATIENT)
Dept: GENERAL RADIOLOGY | Age: 46
End: 2021-08-25
Attending: NURSE PRACTITIONER
Payer: COMMERCIAL

## 2021-08-25 ENCOUNTER — HOSPITAL ENCOUNTER (OUTPATIENT)
Age: 46
Discharge: HOME | End: 2021-08-25
Payer: COMMERCIAL

## 2021-08-25 VITALS
SYSTOLIC BLOOD PRESSURE: 127 MMHG | TEMPERATURE: 97 F | OXYGEN SATURATION: 100 % | WEIGHT: 160 LBS | RESPIRATION RATE: 18 BRPM | BODY MASS INDEX: 24.25 KG/M2 | DIASTOLIC BLOOD PRESSURE: 93 MMHG | HEIGHT: 68 IN | HEART RATE: 85 BPM

## 2021-08-25 DIAGNOSIS — H65.91 RIGHT OTITIS MEDIA WITH EFFUSION: ICD-10-CM

## 2021-08-25 DIAGNOSIS — F41.8 SITUATIONAL ANXIETY: ICD-10-CM

## 2021-08-25 DIAGNOSIS — M25.531 RIGHT WRIST PAIN: Primary | ICD-10-CM

## 2021-08-25 PROCEDURE — 99203 OFFICE O/P NEW LOW 30 MIN: CPT | Performed by: NURSE PRACTITIONER

## 2021-08-25 PROCEDURE — 73110 X-RAY EXAM OF WRIST: CPT | Performed by: NURSE PRACTITIONER

## 2021-08-25 PROCEDURE — L3908 WHO COCK-UP NONMOLDE PRE OTS: HCPCS | Performed by: NURSE PRACTITIONER

## 2021-08-25 RX ORDER — ALPRAZOLAM 0.25 MG/1
0.25 TABLET ORAL 3 TIMES DAILY PRN
Qty: 10 TABLET | Refills: 0 | Status: SHIPPED | OUTPATIENT
Start: 2021-08-25 | End: 2021-09-01

## 2021-08-25 RX ORDER — AMOXICILLIN 875 MG/1
875 TABLET, COATED ORAL 2 TIMES DAILY
Qty: 20 TABLET | Refills: 0 | Status: SHIPPED | OUTPATIENT
Start: 2021-08-25 | End: 2021-09-04

## 2021-08-25 NOTE — ED PROVIDER NOTES
Patient Seen in: [facility]      History   Patient presents with:  Arm or Hand Injury  Ear Pain    Stated Complaint: pain rt wrist/forearm/rt ear pain/anxiety    HPI/Subjective:   48-year-old female presents to immediate care today with multi Gastrointestinal: Negative. Genitourinary: Negative. Musculoskeletal: Positive for arthralgias. Skin: Negative. Neurological: Negative. Psychiatric/Behavioral: The patient is nervous/anxious. All other systems reviewed and are negative. Tenderness present. No swelling, deformity, effusion, lacerations, bony tenderness, snuff box tenderness or crepitus. Normal range of motion. Normal pulse. Right hand: Normal.      Cervical back: Normal range of motion and neck supple.    Skin:     Gen Situational anxiety. Plan continue her Lexapro and I will add a low-dose anxiolytic as needed as needed. Advised her to seek further help should any new or worsening mental illness complaints arise  3. Right ear exam consistent with AOM with effusion.

## 2021-09-07 ENCOUNTER — OFFICE VISIT (OUTPATIENT)
Dept: OTOLARYNGOLOGY | Facility: CLINIC | Age: 46
End: 2021-09-07
Payer: COMMERCIAL

## 2021-09-07 VITALS — BODY MASS INDEX: 24.25 KG/M2 | WEIGHT: 160 LBS | TEMPERATURE: 97 F | HEIGHT: 68 IN

## 2021-09-07 DIAGNOSIS — H92.03 OTALGIA OF BOTH EARS: Primary | ICD-10-CM

## 2021-09-07 PROCEDURE — 3008F BODY MASS INDEX DOCD: CPT | Performed by: OTOLARYNGOLOGY

## 2021-09-07 PROCEDURE — 99243 OFF/OP CNSLTJ NEW/EST LOW 30: CPT | Performed by: OTOLARYNGOLOGY

## 2021-09-07 RX ORDER — CYCLOBENZAPRINE HCL 5 MG
5 TABLET ORAL NIGHTLY
Qty: 30 TABLET | Refills: 1 | Status: SHIPPED | OUTPATIENT
Start: 2021-09-07

## 2021-09-07 RX ORDER — CELECOXIB 200 MG/1
200 CAPSULE ORAL DAILY PRN
Qty: 30 CAPSULE | Refills: 0 | Status: SHIPPED | OUTPATIENT
Start: 2021-09-07 | End: 2021-10-13

## 2021-09-07 RX ORDER — ESCITALOPRAM OXALATE 20 MG/1
20 TABLET ORAL DAILY
COMMUNITY
End: 2021-09-17

## 2021-09-07 NOTE — PROGRESS NOTES
Sherly Peres is a 51year old female.   Patient presents with:  Ear Problem: patient stated  having pain to right ear on and off went ot urgent care a week ,has one more day of antibiotic      HISTORY OF PRESENT ILLNESS  She presents with a history Vertigo 2013       Past Surgical History:   Procedure Laterality Date   • EGD  5/16, 11/17         REVIEW OF SYSTEMS    System Neg/Pos Details   Constitutional Negative Fatigue, fever and weight loss. ENMT Negative Drooling.    Eyes Negative Blurred visio Normal. Lips/teeth/gums - Normal. Tonsils - Normal. Oropharynx - Normal.   Nose/Mouth/Throat Normal Nares - Right: Normal Left: Normal. Septum -Normal  Turbinates - Right: Normal, Left: Normal.       Current Outpatient Medications:   •  escitalopram 20 MG errors during dictation discrepancies may still exist    Jensen Padilla MD    9/7/2021    12:42 PM

## 2021-09-17 NOTE — PROGRESS NOTES
[address]  Dept: [phone]       Patient:  Brody Bass  :      1975  MRN:      [id number]    Chief Complaint:  Patient presen 8 (eight) hours as needed for Nausea.  (Patient not taking: Reported on 8/25/2021 ) 20 tablet 2     Allergies:  Gluten Flour and Beta-Alanine     Social History:  Social History    Socioeconomic History      Marital status:       Spouse name: Not on Stability:       Unable to Pay for Housing in the Last Year: Not on file      Number of Places Lived in the Last Year: Not on file      Unstable Housing in the Last Year: Not on file  Surgical History:    Past Surgical History:   Procedure Laterality Date fatigue  Respiratory: negative  Cardiovascular: negative  Gastrointestinal: negative  Musculoskeletal:negative  Neurological: positive for headaches  Behavioral/Psych: positive for stress  All other systems were reviewed and are negative    Physical Exam: regular soda. 3. Increase activity-upper body exercises, walk 10 minutes per day. 4. Increase fruit and vegetable servings to 5-6 per day.       Will restart Phentermine at 37.5 mg    Will increase lexapro to 30 mg  Refer to [facility]    May benefit from nanette

## 2021-09-29 NOTE — TELEPHONE ENCOUNTER
Future Appointments   Date Time Provider Melissa Rodriguez   10/15/2021 10:15 AM Chidi David, DO [facility] [place]   10/18/2021 10:00 AM Nikita Domingo MD [facility] Alexandra MAGUIRE   11/16/2021 11:00 AM Josselyn Deluca MD [place] [id number]

## 2021-10-13 RX ORDER — CELECOXIB 200 MG/1
200 CAPSULE ORAL DAILY PRN
Qty: 30 CAPSULE | Refills: 0 | Status: SHIPPED | OUTPATIENT
Start: 2021-10-13 | End: 2021-11-12

## 2021-11-08 ENCOUNTER — LAB ENCOUNTER (OUTPATIENT)
Dept: LAB | Age: 46
End: 2021-11-08
Attending: INTERNAL MEDICINE
Payer: COMMERCIAL

## 2021-11-08 DIAGNOSIS — M19.90 ACUTE ARTHRITIS: ICD-10-CM

## 2021-11-08 DIAGNOSIS — M35.9: ICD-10-CM

## 2021-11-08 DIAGNOSIS — E55.9 VITAMIN D DEFICIENCY: ICD-10-CM

## 2021-11-08 DIAGNOSIS — E53.8 BIOTIN-(PROPIONYL-COA-CARBOXYLASE) LIGASE DEFICIENCY: ICD-10-CM

## 2021-11-08 DIAGNOSIS — E78.5 HYPERLIPEMIA: Primary | ICD-10-CM

## 2021-11-08 DIAGNOSIS — R53.82 CHRONIC FATIGUE SYNDROME: ICD-10-CM

## 2021-11-08 PROCEDURE — 86140 C-REACTIVE PROTEIN: CPT

## 2021-11-08 PROCEDURE — 82306 VITAMIN D 25 HYDROXY: CPT

## 2021-11-08 PROCEDURE — 82607 VITAMIN B-12: CPT

## 2021-11-08 PROCEDURE — 82533 TOTAL CORTISOL: CPT

## 2021-11-08 PROCEDURE — 36415 COLL VENOUS BLD VENIPUNCTURE: CPT

## 2021-11-08 PROCEDURE — 86225 DNA ANTIBODY NATIVE: CPT

## 2021-11-08 PROCEDURE — 82627 DEHYDROEPIANDROSTERONE: CPT

## 2021-11-08 PROCEDURE — 80061 LIPID PANEL: CPT

## 2021-11-08 PROCEDURE — 85652 RBC SED RATE AUTOMATED: CPT

## 2021-11-08 PROCEDURE — 86235 NUCLEAR ANTIGEN ANTIBODY: CPT

## 2021-11-08 PROCEDURE — 85025 COMPLETE CBC W/AUTO DIFF WBC: CPT

## 2021-11-08 PROCEDURE — 84439 ASSAY OF FREE THYROXINE: CPT

## 2021-11-08 PROCEDURE — 86431 RHEUMATOID FACTOR QUANT: CPT

## 2021-11-08 PROCEDURE — 84443 ASSAY THYROID STIM HORMONE: CPT

## 2021-11-08 PROCEDURE — 86038 ANTINUCLEAR ANTIBODIES: CPT

## 2021-11-08 PROCEDURE — 80053 COMPREHEN METABOLIC PANEL: CPT

## 2021-11-08 PROCEDURE — 86039 ANTINUCLEAR ANTIBODIES (ANA): CPT

## 2021-11-08 PROCEDURE — 84481 FREE ASSAY (FT-3): CPT

## 2022-02-11 RX ORDER — ROSUVASTATIN CALCIUM 5 MG/1
5 TABLET, COATED ORAL NIGHTLY
Qty: 30 TABLET | Refills: 5 | OUTPATIENT
Start: 2022-02-11

## 2022-02-11 NOTE — TELEPHONE ENCOUNTER
JAYI- pt PCP Dr Ariel Otero.        Please review refill protocol failed/ no protocol  Requested Prescriptions   Pending Prescriptions Disp Refills    ROSUVASTATIN 5 MG Oral Tab [Pharmacy Med Name: ROSUVASTATIN CALCIUM 5 MG TAB] 30 tablet 5     Sig: TAKE 1 TABLET BY MOUTH EVERY DAY EVERY NIGHT        Cholesterol Medication Protocol Failed - 2/11/2022 12:02 AM        Failed - Last LDL < 130     Lab Results   Component Value Date     (H) 11/08/2021               Failed - Appointment in past 12 or next 3 months        Passed - ALT in past 12 months        Passed - LDL in past 12 months        Passed - Last ALT < 80       Lab Results   Component Value Date    ALT 54 11/08/2021

## 2022-03-18 RX ORDER — ROSUVASTATIN CALCIUM 5 MG/1
TABLET, COATED ORAL
Qty: 30 TABLET | Refills: 5 | OUTPATIENT
Start: 2022-03-18

## 2022-05-06 NOTE — TELEPHONE ENCOUNTER
This refill request is being sent to the provider for the following reason:  []Patient has not had an appointment within the past 12 months but has made an appointment on: ___  [x]Medication is not within protocol LOV: 09/07/2021  []Patient did not complete follow up recommendations  []Other: ___

## 2022-05-07 RX ORDER — CYCLOBENZAPRINE HCL 5 MG
TABLET ORAL
Qty: 30 TABLET | Refills: 1 | OUTPATIENT
Start: 2022-05-07

## 2022-06-16 ENCOUNTER — TELEPHONE (OUTPATIENT)
Dept: SURGERY | Facility: CLINIC | Age: 47
End: 2022-06-16

## 2022-06-16 RX ORDER — PHENTERMINE HYDROCHLORIDE 37.5 MG/1
37.5 TABLET ORAL
Qty: 30 TABLET | Refills: 2 | Status: SHIPPED | OUTPATIENT
Start: 2022-06-16

## 2022-06-17 RX ORDER — PHENTERMINE HYDROCHLORIDE 37.5 MG/1
37.5 TABLET ORAL
Qty: 30 TABLET | Refills: 2 | OUTPATIENT
Start: 2022-06-17

## 2022-08-23 RX ORDER — PHENTERMINE HYDROCHLORIDE 37.5 MG/1
37.5 TABLET ORAL
Qty: 30 TABLET | Refills: 2 | Status: SHIPPED | OUTPATIENT
Start: 2022-08-23

## 2022-09-13 ENCOUNTER — OFFICE VISIT (OUTPATIENT)
Dept: SURGERY | Facility: CLINIC | Age: 47
End: 2022-09-13
Payer: COMMERCIAL

## 2022-09-13 VITALS
OXYGEN SATURATION: 98 % | DIASTOLIC BLOOD PRESSURE: 70 MMHG | BODY MASS INDEX: 27.13 KG/M2 | HEIGHT: 68 IN | WEIGHT: 179 LBS | HEART RATE: 72 BPM | SYSTOLIC BLOOD PRESSURE: 112 MMHG

## 2022-09-13 DIAGNOSIS — R63.2 INCREASED APPETITE: ICD-10-CM

## 2022-09-13 DIAGNOSIS — F43.9 STRESS: ICD-10-CM

## 2022-09-13 DIAGNOSIS — Z51.81 ENCOUNTER FOR THERAPEUTIC DRUG MONITORING: ICD-10-CM

## 2022-09-13 DIAGNOSIS — E53.8 B12 DEFICIENCY: Primary | ICD-10-CM

## 2022-09-13 DIAGNOSIS — E78.00 HYPERCHOLESTEREMIA: ICD-10-CM

## 2022-09-13 DIAGNOSIS — E66.3 OVERWEIGHT (BMI 25.0-29.9): ICD-10-CM

## 2022-09-13 PROCEDURE — 3078F DIAST BP <80 MM HG: CPT | Performed by: INTERNAL MEDICINE

## 2022-09-13 PROCEDURE — 3074F SYST BP LT 130 MM HG: CPT | Performed by: INTERNAL MEDICINE

## 2022-09-13 PROCEDURE — 3008F BODY MASS INDEX DOCD: CPT | Performed by: INTERNAL MEDICINE

## 2022-09-13 PROCEDURE — 96372 THER/PROPH/DIAG INJ SC/IM: CPT | Performed by: INTERNAL MEDICINE

## 2022-09-13 PROCEDURE — 99214 OFFICE O/P EST MOD 30 MIN: CPT | Performed by: INTERNAL MEDICINE

## 2022-09-13 RX ORDER — ESCITALOPRAM OXALATE 20 MG/1
30 TABLET ORAL DAILY
Qty: 135 TABLET | Refills: 1 | Status: SHIPPED | OUTPATIENT
Start: 2022-09-13 | End: 2022-12-12

## 2022-09-13 RX ORDER — CYANOCOBALAMIN 1000 UG/ML
1000 INJECTION INTRAMUSCULAR; SUBCUTANEOUS ONCE
Status: COMPLETED | OUTPATIENT
Start: 2022-09-13 | End: 2022-09-13

## 2022-09-13 RX ADMIN — CYANOCOBALAMIN 1000 MCG: 1000 INJECTION INTRAMUSCULAR; SUBCUTANEOUS at 15:11:00

## 2022-09-14 RX ORDER — ESCITALOPRAM OXALATE 20 MG/1
TABLET ORAL
Qty: 135 TABLET | Refills: 1 | OUTPATIENT
Start: 2022-09-14

## 2022-09-29 ENCOUNTER — NURSE ONLY (OUTPATIENT)
Dept: SURGERY | Facility: CLINIC | Age: 47
End: 2022-09-29

## 2022-09-29 DIAGNOSIS — E53.8 B12 DEFICIENCY: Primary | ICD-10-CM

## 2022-09-29 PROCEDURE — 96372 THER/PROPH/DIAG INJ SC/IM: CPT | Performed by: INTERNAL MEDICINE

## 2022-09-29 RX ORDER — CYANOCOBALAMIN 1000 UG/ML
1000 INJECTION INTRAMUSCULAR; SUBCUTANEOUS ONCE
Status: COMPLETED | OUTPATIENT
Start: 2022-09-29 | End: 2022-09-29

## 2022-09-29 RX ADMIN — CYANOCOBALAMIN 1000 MCG: 1000 INJECTION INTRAMUSCULAR; SUBCUTANEOUS at 10:20:00

## 2022-10-17 ENCOUNTER — NURSE ONLY (OUTPATIENT)
Dept: SURGERY | Facility: CLINIC | Age: 47
End: 2022-10-17
Payer: COMMERCIAL

## 2022-10-17 DIAGNOSIS — E53.8 B12 DEFICIENCY: Primary | ICD-10-CM

## 2022-10-17 RX ORDER — CYANOCOBALAMIN 1000 UG/ML
1000 INJECTION INTRAMUSCULAR; SUBCUTANEOUS ONCE
Status: COMPLETED | OUTPATIENT
Start: 2022-10-17 | End: 2022-10-17

## 2022-10-17 RX ADMIN — CYANOCOBALAMIN 1000 MCG: 1000 INJECTION INTRAMUSCULAR; SUBCUTANEOUS at 09:50:00

## 2022-10-26 ENCOUNTER — IMAGING SERVICES (OUTPATIENT)
Dept: OTHER | Age: 47
End: 2022-10-26
Attending: OBSTETRICS & GYNECOLOGY

## 2023-02-09 DIAGNOSIS — E66.3 OVERWEIGHT (BMI 25.0-29.9): Primary | ICD-10-CM

## 2023-02-09 RX ORDER — PHENTERMINE HYDROCHLORIDE 37.5 MG/1
37.5 TABLET ORAL
Qty: 30 TABLET | Refills: 0 | Status: SHIPPED | OUTPATIENT
Start: 2023-02-09

## 2023-02-13 ENCOUNTER — TELEPHONE (OUTPATIENT)
Dept: SURGERY | Facility: CLINIC | Age: 48
End: 2023-02-13

## 2023-02-14 ENCOUNTER — DOCUMENTATION ONLY (OUTPATIENT)
Dept: SURGERY | Facility: CLINIC | Age: 48
End: 2023-02-14

## 2023-02-14 ENCOUNTER — PATIENT MESSAGE (OUTPATIENT)
Dept: SURGERY | Facility: CLINIC | Age: 48
End: 2023-02-14

## 2023-02-14 NOTE — TELEPHONE ENCOUNTER
From: Kimber Kilpatrick  To: Brandy Iqbal MD  Sent: 2/14/2023 10:10 AM CST  Subject: Phentermine/Wegovy    Dr. Jose Guadalupe Palomares,    Can you check into these? Still have not heard anything from [facility] as to both scripts? ? Thanks!     Ellen You

## 2023-02-15 ENCOUNTER — TELEPHONE (OUTPATIENT)
Dept: SURGERY | Facility: CLINIC | Age: 48
End: 2023-02-15

## 2023-02-15 RX ORDER — PHENTERMINE HYDROCHLORIDE 37.5 MG/1
37.5 CAPSULE ORAL EVERY MORNING
Qty: 30 CAPSULE | Refills: 1 | Status: SHIPPED | OUTPATIENT
Start: 2023-02-15

## 2023-02-16 ENCOUNTER — DOCUMENTATION ONLY (OUTPATIENT)
Dept: SURGERY | Facility: CLINIC | Age: 48
End: 2023-02-16

## 2023-02-20 ENCOUNTER — LAB ENCOUNTER (OUTPATIENT)
Dept: LAB | Age: 48
End: 2023-02-20
Attending: INTERNAL MEDICINE
Payer: COMMERCIAL

## 2023-02-20 DIAGNOSIS — E55.9 VITAMIN D DEFICIENCY: Primary | ICD-10-CM

## 2023-02-20 DIAGNOSIS — R53.82 CHRONIC FATIGUE: ICD-10-CM

## 2023-02-20 DIAGNOSIS — E78.5 HYPERLIPIDEMIA: ICD-10-CM

## 2023-02-20 DIAGNOSIS — E53.8 BIOTIN-(PROPIONYL-COA-CARBOXYLASE) LIGASE DEFICIENCY: ICD-10-CM

## 2023-02-20 LAB
ALBUMIN SERPL-MCNC: 3.8 G/DL (ref 3.4–5)
ALBUMIN/GLOB SERPL: 1.2 {RATIO} (ref 1–2)
ALP LIVER SERPL-CCNC: 66 U/L
ALT SERPL-CCNC: 21 U/L
ANION GAP SERPL CALC-SCNC: 8 MMOL/L (ref 0–18)
AST SERPL-CCNC: 19 U/L (ref 15–37)
BASOPHILS # BLD AUTO: 0.05 X10(3) UL (ref 0–0.2)
BASOPHILS NFR BLD AUTO: 1 %
BILIRUB SERPL-MCNC: 0.6 MG/DL (ref 0.1–2)
BUN BLD-MCNC: 16 MG/DL (ref 7–18)
BUN/CREAT SERPL: 20.3 (ref 10–20)
CALCIUM BLD-MCNC: 8.9 MG/DL (ref 8.5–10.1)
CHLORIDE SERPL-SCNC: 108 MMOL/L (ref 98–112)
CHOLEST SERPL-MCNC: 231 MG/DL (ref ?–200)
CO2 SERPL-SCNC: 26 MMOL/L (ref 21–32)
CREAT BLD-MCNC: 0.79 MG/DL
DEPRECATED RDW RBC AUTO: 41.8 FL (ref 35.1–46.3)
EOSINOPHIL # BLD AUTO: 0.15 X10(3) UL (ref 0–0.7)
EOSINOPHIL NFR BLD AUTO: 3 %
ERYTHROCYTE [DISTWIDTH] IN BLOOD BY AUTOMATED COUNT: 13.6 % (ref 11–15)
FASTING PATIENT LIPID ANSWER: YES
FASTING STATUS PATIENT QL REPORTED: YES
GFR SERPLBLD BASED ON 1.73 SQ M-ARVRAT: 92 ML/MIN/1.73M2 (ref 60–?)
GLOBULIN PLAS-MCNC: 3.1 G/DL (ref 2.8–4.4)
GLUCOSE BLD-MCNC: 86 MG/DL (ref 70–99)
HCT VFR BLD AUTO: 41.3 %
HDLC SERPL-MCNC: 46 MG/DL (ref 40–59)
HGB BLD-MCNC: 13.6 G/DL
IMM GRANULOCYTES # BLD AUTO: 0.01 X10(3) UL (ref 0–1)
IMM GRANULOCYTES NFR BLD: 0.2 %
LDLC SERPL CALC-MCNC: 167 MG/DL (ref ?–100)
LYMPHOCYTES # BLD AUTO: 1.76 X10(3) UL (ref 1–4)
LYMPHOCYTES NFR BLD AUTO: 35.2 %
MCH RBC QN AUTO: 27.6 PG (ref 26–34)
MCHC RBC AUTO-ENTMCNC: 32.9 G/DL (ref 31–37)
MCV RBC AUTO: 83.9 FL
MONOCYTES # BLD AUTO: 0.38 X10(3) UL (ref 0.1–1)
MONOCYTES NFR BLD AUTO: 7.6 %
NEUTROPHILS # BLD AUTO: 2.65 X10 (3) UL (ref 1.5–7.7)
NEUTROPHILS # BLD AUTO: 2.65 X10(3) UL (ref 1.5–7.7)
NEUTROPHILS NFR BLD AUTO: 53 %
NONHDLC SERPL-MCNC: 185 MG/DL (ref ?–130)
OSMOLALITY SERPL CALC.SUM OF ELEC: 294 MOSM/KG (ref 275–295)
PLATELET # BLD AUTO: 282 10(3)UL (ref 150–450)
POTASSIUM SERPL-SCNC: 4 MMOL/L (ref 3.5–5.1)
PROT SERPL-MCNC: 6.9 G/DL (ref 6.4–8.2)
RBC # BLD AUTO: 4.92 X10(6)UL
SODIUM SERPL-SCNC: 142 MMOL/L (ref 136–145)
T3FREE SERPL-MCNC: 2.3 PG/ML (ref 2.4–4.2)
T4 FREE SERPL-MCNC: 0.9 NG/DL (ref 0.8–1.7)
TRIGL SERPL-MCNC: 101 MG/DL (ref 30–149)
VIT B12 SERPL-MCNC: 244 PG/ML (ref 193–986)
VIT D+METAB SERPL-MCNC: 14.7 NG/ML (ref 30–100)
VLDLC SERPL CALC-MCNC: 20 MG/DL (ref 0–30)
WBC # BLD AUTO: 5 X10(3) UL (ref 4–11)

## 2023-02-20 PROCEDURE — 85025 COMPLETE CBC W/AUTO DIFF WBC: CPT

## 2023-02-20 PROCEDURE — 80061 LIPID PANEL: CPT

## 2023-02-20 PROCEDURE — 84481 FREE ASSAY (FT-3): CPT

## 2023-02-20 PROCEDURE — 82607 VITAMIN B-12: CPT

## 2023-02-20 PROCEDURE — 36415 COLL VENOUS BLD VENIPUNCTURE: CPT

## 2023-02-20 PROCEDURE — 84439 ASSAY OF FREE THYROXINE: CPT

## 2023-02-20 PROCEDURE — 80053 COMPREHEN METABOLIC PANEL: CPT

## 2023-02-20 PROCEDURE — 82306 VITAMIN D 25 HYDROXY: CPT

## 2023-03-01 ENCOUNTER — OFFICE VISIT (OUTPATIENT)
Dept: SURGERY | Facility: CLINIC | Age: 48
End: 2023-03-01
Payer: COMMERCIAL

## 2023-03-01 VITALS
HEIGHT: 68 IN | WEIGHT: 186 LBS | DIASTOLIC BLOOD PRESSURE: 82 MMHG | SYSTOLIC BLOOD PRESSURE: 120 MMHG | HEART RATE: 58 BPM | BODY MASS INDEX: 28.19 KG/M2 | OXYGEN SATURATION: 98 %

## 2023-03-01 DIAGNOSIS — E66.3 OVERWEIGHT (BMI 25.0-29.9): ICD-10-CM

## 2023-03-01 DIAGNOSIS — E55.9 VITAMIN D DEFICIENCY: ICD-10-CM

## 2023-03-01 DIAGNOSIS — E53.8 B12 DEFICIENCY: Primary | ICD-10-CM

## 2023-03-01 DIAGNOSIS — F43.9 STRESS: ICD-10-CM

## 2023-03-01 DIAGNOSIS — Z51.81 ENCOUNTER FOR THERAPEUTIC DRUG MONITORING: ICD-10-CM

## 2023-03-01 DIAGNOSIS — R11.0 NAUSEA: ICD-10-CM

## 2023-03-01 PROCEDURE — 3079F DIAST BP 80-89 MM HG: CPT | Performed by: INTERNAL MEDICINE

## 2023-03-01 PROCEDURE — 99214 OFFICE O/P EST MOD 30 MIN: CPT | Performed by: INTERNAL MEDICINE

## 2023-03-01 PROCEDURE — 96372 THER/PROPH/DIAG INJ SC/IM: CPT | Performed by: INTERNAL MEDICINE

## 2023-03-01 PROCEDURE — 3074F SYST BP LT 130 MM HG: CPT | Performed by: INTERNAL MEDICINE

## 2023-03-01 PROCEDURE — 3008F BODY MASS INDEX DOCD: CPT | Performed by: INTERNAL MEDICINE

## 2023-03-01 RX ORDER — CYANOCOBALAMIN 1000 UG/ML
1000 INJECTION, SOLUTION INTRAMUSCULAR; SUBCUTANEOUS ONCE
Status: COMPLETED | OUTPATIENT
Start: 2023-03-01 | End: 2023-03-01

## 2023-03-01 RX ORDER — ERGOCALCIFEROL 1.25 MG/1
50000 CAPSULE ORAL WEEKLY
Qty: 12 CAPSULE | Refills: 1 | Status: SHIPPED | OUTPATIENT
Start: 2023-03-01 | End: 2023-05-18

## 2023-03-01 RX ORDER — ESCITALOPRAM OXALATE 20 MG/1
30 TABLET ORAL DAILY
COMMUNITY
Start: 2023-02-11

## 2023-03-01 RX ORDER — PEN NEEDLE, DIABETIC 30 GX3/16"
1 NEEDLE, DISPOSABLE MISCELLANEOUS DAILY
Qty: 90 EACH | Refills: 0 | Status: SHIPPED | OUTPATIENT
Start: 2023-03-01 | End: 2023-05-30

## 2023-03-01 RX ORDER — ONDANSETRON 4 MG/1
4 TABLET, ORALLY DISINTEGRATING ORAL EVERY 8 HOURS PRN
Status: SHIPPED | OUTPATIENT
Start: 2023-03-01

## 2023-03-01 RX ORDER — PHENTERMINE HYDROCHLORIDE 37.5 MG/1
37.5 TABLET ORAL
Qty: 30 TABLET | Refills: 2 | Status: SHIPPED | OUTPATIENT
Start: 2023-03-01

## 2023-03-01 RX ORDER — LIRAGLUTIDE 6 MG/ML
INJECTION, SOLUTION SUBCUTANEOUS
Qty: 3 ML | Refills: 0 | Status: SHIPPED | OUTPATIENT
Start: 2023-03-01 | End: 2023-04-28

## 2023-03-01 RX ADMIN — CYANOCOBALAMIN 1000 MCG: 1000 INJECTION, SOLUTION INTRAMUSCULAR; SUBCUTANEOUS at 11:50:00

## 2023-03-07 ENCOUNTER — TELEPHONE (OUTPATIENT)
Dept: SURGERY | Facility: CLINIC | Age: 48
End: 2023-03-07

## 2023-03-14 DIAGNOSIS — F43.9 REACTION TO SEVERE STRESS, UNSPECIFIED: ICD-10-CM

## 2023-03-14 RX ORDER — ESCITALOPRAM OXALATE 20 MG/1
TABLET ORAL
Qty: 45 TABLET | Refills: 5 | Status: SHIPPED | OUTPATIENT
Start: 2023-03-14

## 2023-04-11 DIAGNOSIS — E78.5 DYSLIPIDEMIA: Primary | ICD-10-CM

## 2023-04-11 DIAGNOSIS — E66.3 OVERWEIGHT (BMI 25.0-29.9): ICD-10-CM

## 2023-04-11 RX ORDER — PHENTERMINE HYDROCHLORIDE 37.5 MG/1
TABLET ORAL
Qty: 30 TABLET | Refills: 0 | Status: SHIPPED | OUTPATIENT
Start: 2023-04-11

## 2023-06-08 ENCOUNTER — OFFICE VISIT (OUTPATIENT)
Dept: OTOLARYNGOLOGY | Facility: CLINIC | Age: 48
End: 2023-06-08
Payer: COMMERCIAL

## 2023-06-08 VITALS — BODY MASS INDEX: 28 KG/M2 | WEIGHT: 186 LBS

## 2023-06-08 DIAGNOSIS — H92.03 OTALGIA OF BOTH EARS: Primary | ICD-10-CM

## 2023-06-08 PROCEDURE — 99213 OFFICE O/P EST LOW 20 MIN: CPT | Performed by: OTOLARYNGOLOGY

## 2023-06-08 RX ORDER — CELECOXIB 200 MG/1
200 CAPSULE ORAL DAILY PRN
Qty: 30 CAPSULE | Refills: 0 | Status: SHIPPED | OUTPATIENT
Start: 2023-06-08 | End: 2023-07-08

## 2023-06-08 RX ORDER — CYCLOBENZAPRINE HCL 5 MG
5 TABLET ORAL NIGHTLY
Qty: 30 TABLET | Refills: 1 | Status: SHIPPED | OUTPATIENT
Start: 2023-06-08

## 2023-07-07 DIAGNOSIS — E66.3 OVERWEIGHT (BMI 25.0-29.9): ICD-10-CM

## 2023-07-10 RX ORDER — PEN NEEDLE, DIABETIC 32GX 5/32"
NEEDLE, DISPOSABLE MISCELLANEOUS
Refills: 0 | OUTPATIENT
Start: 2023-07-10

## 2023-07-13 RX ORDER — CELECOXIB 200 MG/1
CAPSULE ORAL
Qty: 30 CAPSULE | Refills: 0 | Status: SHIPPED | OUTPATIENT
Start: 2023-07-13

## 2023-09-27 NOTE — TELEPHONE ENCOUNTER
This refill request is being sent to the provider for the following reason:  []Patient has not had an appointment within the past 12 months but has made an appointment on: ___  [x]Medication is not within protocol  []Patient did not complete follow up recommendations  []Other: ___  Dr. Javi Garcia, please review and send. Thank you.

## 2023-09-28 DIAGNOSIS — R11.0 NAUSEA: Primary | ICD-10-CM

## 2023-09-28 RX ORDER — ONDANSETRON 4 MG/1
4 TABLET, FILM COATED ORAL EVERY 8 HOURS PRN
Qty: 30 TABLET | Refills: 2 | Status: SHIPPED | OUTPATIENT
Start: 2023-09-28 | End: 2023-10-28

## 2023-10-02 RX ORDER — CYCLOBENZAPRINE HCL 5 MG
5 TABLET ORAL NIGHTLY
Qty: 30 TABLET | Refills: 1 | Status: SHIPPED | OUTPATIENT
Start: 2023-10-02

## 2023-10-25 DIAGNOSIS — F32.9 REACTIVE DEPRESSION: ICD-10-CM

## 2023-10-25 RX ORDER — BUPROPION HYDROCHLORIDE 150 MG/1
150 TABLET ORAL DAILY
Qty: 30 TABLET | Refills: 2 | Status: SHIPPED | OUTPATIENT
Start: 2023-10-25

## 2023-11-06 DIAGNOSIS — R11.0 NAUSEA: ICD-10-CM

## 2023-11-06 RX ORDER — ONDANSETRON 4 MG/1
4 TABLET, FILM COATED ORAL EVERY 8 HOURS PRN
Qty: 30 TABLET | Refills: 2 | Status: SHIPPED | OUTPATIENT
Start: 2023-11-06 | End: 2023-12-06

## 2023-11-27 ENCOUNTER — HOSPITAL ENCOUNTER (OUTPATIENT)
Age: 48
Discharge: HOME | End: 2023-11-27
Payer: COMMERCIAL

## 2023-11-27 VITALS
OXYGEN SATURATION: 100 % | TEMPERATURE: 97 F | SYSTOLIC BLOOD PRESSURE: 121 MMHG | RESPIRATION RATE: 18 BRPM | HEART RATE: 83 BPM | DIASTOLIC BLOOD PRESSURE: 84 MMHG

## 2023-11-27 DIAGNOSIS — H65.191 ACUTE EFFUSION OF RIGHT EAR: Primary | ICD-10-CM

## 2023-11-27 DIAGNOSIS — M26.609 TMJ (TEMPOROMANDIBULAR JOINT SYNDROME): ICD-10-CM

## 2023-11-27 PROCEDURE — 99212 OFFICE O/P EST SF 10 MIN: CPT

## 2023-11-27 PROCEDURE — 99213 OFFICE O/P EST LOW 20 MIN: CPT

## 2023-11-27 RX ORDER — PSEUDOEPHEDRINE HCL 30 MG
30 TABLET ORAL EVERY 4 HOURS PRN
Qty: 20 TABLET | Refills: 0 | Status: SHIPPED | OUTPATIENT
Start: 2023-11-27

## 2023-11-27 RX ORDER — FLUTICASONE PROPIONATE 50 MCG
2 SPRAY, SUSPENSION (ML) NASAL DAILY
Qty: 16 G | Refills: 0 | Status: SHIPPED | OUTPATIENT
Start: 2023-11-27 | End: 2023-12-27

## 2023-11-27 NOTE — DISCHARGE INSTRUCTIONS
Pseudoephedrine as directed for congestion.  DO NOT drive/operate machinery after taking   Fluticasone nasal spray as directed for congestion   You may benefit from taking a daily allergy medicine (e.g. Zyrtec)  You may benefit from wearing a mouth guard to sleep   Follow up with dentist/oral surgeon for TMJ evaluation

## 2023-11-27 NOTE — ED INITIAL ASSESSMENT (HPI)
Presents with right ear pain intermittently for 6 months. Seen by ENT in the past for same. Onset of pain yesterday. No fever. No dizziness. No congestion. Not relieved with Ibuprofen.

## 2024-01-15 DIAGNOSIS — F32.9 REACTIVE DEPRESSION: ICD-10-CM

## 2024-01-15 RX ORDER — BUPROPION HYDROCHLORIDE 150 MG/1
150 TABLET ORAL DAILY
Qty: 30 TABLET | Refills: 2 | Status: SHIPPED | OUTPATIENT
Start: 2024-01-15

## 2024-02-02 ENCOUNTER — LAB ENCOUNTER (OUTPATIENT)
Dept: LAB | Age: 49
End: 2024-02-02
Attending: INTERNAL MEDICINE
Payer: COMMERCIAL

## 2024-02-02 DIAGNOSIS — E55.9 VITAMIN D DEFICIENCY: Primary | ICD-10-CM

## 2024-02-02 DIAGNOSIS — E78.5 HYPERLIPIDEMIA: ICD-10-CM

## 2024-02-02 DIAGNOSIS — E53.8 BIOTIN-(PROPIONYL-COA-CARBOXYLASE) LIGASE DEFICIENCY: ICD-10-CM

## 2024-02-02 LAB
ALBUMIN SERPL-MCNC: 4.4 G/DL (ref 3.2–4.8)
ALBUMIN/GLOB SERPL: 1.7 {RATIO} (ref 1–2)
ALP LIVER SERPL-CCNC: 61 U/L
ALT SERPL-CCNC: 14 U/L
ANION GAP SERPL CALC-SCNC: 4 MMOL/L (ref 0–18)
AST SERPL-CCNC: 21 U/L (ref ?–34)
BASOPHILS # BLD AUTO: 0.05 X10(3) UL (ref 0–0.2)
BASOPHILS NFR BLD AUTO: 1.1 %
BILIRUB SERPL-MCNC: 0.7 MG/DL (ref 0.3–1.2)
BUN BLD-MCNC: 14 MG/DL (ref 9–23)
BUN/CREAT SERPL: 15.9 (ref 10–20)
CALCIUM BLD-MCNC: 9.4 MG/DL (ref 8.7–10.4)
CHLORIDE SERPL-SCNC: 108 MMOL/L (ref 98–112)
CHOLEST SERPL-MCNC: 223 MG/DL (ref ?–200)
CO2 SERPL-SCNC: 30 MMOL/L (ref 21–32)
CREAT BLD-MCNC: 0.88 MG/DL
DEPRECATED RDW RBC AUTO: 40.7 FL (ref 35.1–46.3)
EGFRCR SERPLBLD CKD-EPI 2021: 81 ML/MIN/1.73M2 (ref 60–?)
EOSINOPHIL # BLD AUTO: 0.21 X10(3) UL (ref 0–0.7)
EOSINOPHIL NFR BLD AUTO: 4.7 %
ERYTHROCYTE [DISTWIDTH] IN BLOOD BY AUTOMATED COUNT: 12.8 % (ref 11–15)
FASTING PATIENT LIPID ANSWER: YES
FASTING STATUS PATIENT QL REPORTED: YES
GLOBULIN PLAS-MCNC: 2.6 G/DL (ref 2.8–4.4)
GLUCOSE BLD-MCNC: 79 MG/DL (ref 70–99)
HCT VFR BLD AUTO: 39.6 %
HDLC SERPL-MCNC: 50 MG/DL (ref 40–59)
HGB BLD-MCNC: 13.5 G/DL
IMM GRANULOCYTES # BLD AUTO: 0 X10(3) UL (ref 0–1)
IMM GRANULOCYTES NFR BLD: 0 %
LDLC SERPL CALC-MCNC: 158 MG/DL (ref ?–100)
LYMPHOCYTES # BLD AUTO: 1.81 X10(3) UL (ref 1–4)
LYMPHOCYTES NFR BLD AUTO: 40.8 %
MCH RBC QN AUTO: 29.6 PG (ref 26–34)
MCHC RBC AUTO-ENTMCNC: 34.1 G/DL (ref 31–37)
MCV RBC AUTO: 86.8 FL
MONOCYTES # BLD AUTO: 0.33 X10(3) UL (ref 0.1–1)
MONOCYTES NFR BLD AUTO: 7.4 %
NEUTROPHILS # BLD AUTO: 2.04 X10 (3) UL (ref 1.5–7.7)
NEUTROPHILS # BLD AUTO: 2.04 X10(3) UL (ref 1.5–7.7)
NEUTROPHILS NFR BLD AUTO: 46 %
NONHDLC SERPL-MCNC: 173 MG/DL (ref ?–130)
OSMOLALITY SERPL CALC.SUM OF ELEC: 293 MOSM/KG (ref 275–295)
PLATELET # BLD AUTO: 246 10(3)UL (ref 150–450)
POTASSIUM SERPL-SCNC: 4 MMOL/L (ref 3.5–5.1)
PROT SERPL-MCNC: 7 G/DL (ref 5.7–8.2)
RBC # BLD AUTO: 4.56 X10(6)UL
RHEUMATOID FACT SERPL-ACNC: <10 IU/ML (ref ?–14)
SODIUM SERPL-SCNC: 142 MMOL/L (ref 136–145)
TRIGL SERPL-MCNC: 83 MG/DL (ref 30–149)
VIT B12 SERPL-MCNC: 783 PG/ML (ref 211–911)
VIT D+METAB SERPL-MCNC: 31.3 NG/ML (ref 30–100)
VLDLC SERPL CALC-MCNC: 16 MG/DL (ref 0–30)
WBC # BLD AUTO: 4.4 X10(3) UL (ref 4–11)

## 2024-02-02 PROCEDURE — 82607 VITAMIN B-12: CPT

## 2024-02-02 PROCEDURE — 80061 LIPID PANEL: CPT

## 2024-02-02 PROCEDURE — 86225 DNA ANTIBODY NATIVE: CPT

## 2024-02-02 PROCEDURE — 86431 RHEUMATOID FACTOR QUANT: CPT

## 2024-02-02 PROCEDURE — 86235 NUCLEAR ANTIGEN ANTIBODY: CPT

## 2024-02-02 PROCEDURE — 80053 COMPREHEN METABOLIC PANEL: CPT

## 2024-02-02 PROCEDURE — 85025 COMPLETE CBC W/AUTO DIFF WBC: CPT

## 2024-02-02 PROCEDURE — 82306 VITAMIN D 25 HYDROXY: CPT

## 2024-02-02 PROCEDURE — 86038 ANTINUCLEAR ANTIBODIES: CPT

## 2024-02-02 PROCEDURE — 36415 COLL VENOUS BLD VENIPUNCTURE: CPT

## 2024-02-05 LAB
DSDNA IGG SERPL IA-ACNC: 0.7 IU/ML
ENA AB SER QL IA: 0.7 UG/L

## 2024-02-06 ENCOUNTER — IMAGING SERVICES (OUTPATIENT)
Dept: OTHER | Age: 49
End: 2024-02-06
Attending: OBSTETRICS & GYNECOLOGY

## 2024-02-08 LAB
CENTROMERE IGG SER-ACNC: 4.8 U/ML
ENA JO1 AB SER IA-ACNC: <0.3 U/ML
ENA RNP IGG SER IA-ACNC: 0.4 U/ML
ENA SCL70 IGG SER IA-ACNC: 1.3 U/ML
ENA SM IGG SER IA-ACNC: 0.9 U/ML
ENA SS-A IGG SER IA-ACNC: 0.6 U/ML
ENA SS-B IGG SER IA-ACNC: <0.4 U/ML
U1 SNRNP IGG SER IA-ACNC: 1.5 U/ML

## 2024-02-19 DIAGNOSIS — F32.9 REACTIVE DEPRESSION: ICD-10-CM

## 2024-02-19 DIAGNOSIS — F43.9 REACTION TO SEVERE STRESS, UNSPECIFIED: ICD-10-CM

## 2024-02-19 RX ORDER — BUPROPION HYDROCHLORIDE 150 MG/1
150 TABLET ORAL DAILY
Qty: 90 TABLET | Refills: 0 | Status: SHIPPED | OUTPATIENT
Start: 2024-02-19 | End: 2024-05-19

## 2024-02-19 RX ORDER — ESCITALOPRAM OXALATE 20 MG/1
30 TABLET ORAL DAILY
Qty: 45 TABLET | Refills: 5 | Status: SHIPPED | OUTPATIENT
Start: 2024-02-19

## 2024-04-23 ENCOUNTER — IMAGING SERVICES (OUTPATIENT)
Dept: OTHER | Age: 49
End: 2024-04-23
Attending: OBSTETRICS & GYNECOLOGY

## 2024-05-22 DIAGNOSIS — F32.9 REACTIVE DEPRESSION: ICD-10-CM

## 2024-05-22 RX ORDER — BUPROPION HYDROCHLORIDE 150 MG/1
150 TABLET ORAL DAILY
Qty: 90 TABLET | Refills: 0 | Status: SHIPPED | OUTPATIENT
Start: 2024-05-22

## 2024-06-30 DIAGNOSIS — F43.9 REACTION TO SEVERE STRESS, UNSPECIFIED: ICD-10-CM

## 2024-07-01 RX ORDER — ESCITALOPRAM OXALATE 20 MG/1
30 TABLET ORAL DAILY
Qty: 135 TABLET | Refills: 1 | Status: SHIPPED | OUTPATIENT
Start: 2024-07-01

## 2024-07-08 ENCOUNTER — TELEPHONE (OUTPATIENT)
Dept: SURGERY | Facility: CLINIC | Age: 49
End: 2024-07-08

## 2024-08-20 RX ORDER — ONDANSETRON 4 MG/1
4 TABLET, ORALLY DISINTEGRATING ORAL EVERY 8 HOURS PRN
Qty: 30 TABLET | Refills: 2 | Status: SHIPPED | OUTPATIENT
Start: 2024-08-20

## 2024-09-10 DIAGNOSIS — F32.9 REACTIVE DEPRESSION: ICD-10-CM

## 2024-09-11 RX ORDER — BUPROPION HYDROCHLORIDE 150 MG/1
150 TABLET ORAL DAILY
Qty: 90 TABLET | Refills: 0 | Status: SHIPPED | OUTPATIENT
Start: 2024-09-11

## 2024-12-13 DIAGNOSIS — F32.9 REACTIVE DEPRESSION: ICD-10-CM

## 2024-12-13 RX ORDER — BUPROPION HYDROCHLORIDE 150 MG/1
150 TABLET ORAL DAILY
Qty: 90 TABLET | Refills: 0 | Status: SHIPPED | OUTPATIENT
Start: 2024-12-13

## 2025-02-03 DIAGNOSIS — N63.20 MASS OF LEFT BREAST, UNSPECIFIED QUADRANT: Primary | ICD-10-CM

## 2025-02-03 DIAGNOSIS — R92.8 ABNORMAL MAMMOGRAM: Primary | ICD-10-CM

## 2025-02-11 ENCOUNTER — HOSPITAL ENCOUNTER (OUTPATIENT)
Dept: CT IMAGING | Age: 50
Discharge: HOME | End: 2025-02-11
Attending: OBSTETRICS & GYNECOLOGY

## 2025-02-11 DIAGNOSIS — R92.8 ABNORMAL MAMMOGRAM: ICD-10-CM

## 2025-02-11 DIAGNOSIS — N63.20 MASS OF LEFT BREAST, UNSPECIFIED QUADRANT: ICD-10-CM

## 2025-02-11 PROCEDURE — 77066 DX MAMMO INCL CAD BI: CPT

## 2025-02-11 PROCEDURE — 76642 ULTRASOUND BREAST LIMITED: CPT

## 2025-03-04 DIAGNOSIS — F32.9 REACTIVE DEPRESSION: ICD-10-CM

## 2025-03-04 RX ORDER — BUPROPION HYDROCHLORIDE 150 MG/1
150 TABLET ORAL DAILY
Qty: 90 TABLET | Refills: 0 | Status: SHIPPED | OUTPATIENT
Start: 2025-03-04

## 2025-03-27 ENCOUNTER — PATIENT MESSAGE (OUTPATIENT)
Dept: SURGERY | Facility: CLINIC | Age: 50
End: 2025-03-27

## 2025-03-27 DIAGNOSIS — E66.3 OVERWEIGHT (BMI 25.0-29.9): ICD-10-CM

## 2025-03-27 DIAGNOSIS — F32.9 REACTIVE DEPRESSION: ICD-10-CM

## 2025-03-27 RX ORDER — PHENTERMINE HYDROCHLORIDE 37.5 MG/1
TABLET ORAL
Qty: 30 TABLET | Refills: 5 | Status: CANCELLED | OUTPATIENT
Start: 2025-03-27

## 2025-03-27 RX ORDER — BUPROPION HYDROCHLORIDE 150 MG/1
150 TABLET ORAL DAILY
Qty: 90 TABLET | Refills: 0 | OUTPATIENT
Start: 2025-03-27

## 2025-03-27 RX ORDER — ONDANSETRON 4 MG/1
4 TABLET, ORALLY DISINTEGRATING ORAL EVERY 8 HOURS PRN
Qty: 30 TABLET | Refills: 2 | OUTPATIENT
Start: 2025-03-27

## 2025-03-27 RX ORDER — PHENTERMINE HYDROCHLORIDE 37.5 MG/1
TABLET ORAL
Qty: 30 TABLET | Refills: 5 | OUTPATIENT
Start: 2025-03-27

## 2025-04-11 ENCOUNTER — OFFICE VISIT (OUTPATIENT)
Dept: OTOLARYNGOLOGY | Facility: CLINIC | Age: 50
End: 2025-04-11

## 2025-04-11 VITALS — WEIGHT: 152.19 LBS | BODY MASS INDEX: 23.06 KG/M2 | HEIGHT: 68 IN

## 2025-04-11 DIAGNOSIS — E06.9 THYROIDITIS: Primary | ICD-10-CM

## 2025-04-11 DIAGNOSIS — M26.609 TMJ DYSFUNCTION: ICD-10-CM

## 2025-04-11 DIAGNOSIS — H92.01 REFERRED OTALGIA OF RIGHT EAR: ICD-10-CM

## 2025-04-11 PROCEDURE — 99213 OFFICE O/P EST LOW 20 MIN: CPT | Performed by: SPECIALIST

## 2025-04-11 PROCEDURE — 3008F BODY MASS INDEX DOCD: CPT | Performed by: SPECIALIST

## 2025-04-12 NOTE — PATIENT INSTRUCTIONS
Your thyroid thought to granular on the day to your visit.  An ultrasound was ordered.  We reviewed your thyroid function test which were normal on 10/9/2024.  You also had a test for Hashimoto's thyroiditis which was also negative on the same date.  You had clicking of your temporomandibular joint right greater than left however no pain in the day to your visit.  You were given a handout for TMJ arthralgia.  I will of course call you with the results of the thyroid ultrasound.  
No

## 2025-04-12 NOTE — PROGRESS NOTES
Jaylene Sesay is a 49 year old female.   Chief Complaint   Patient presents with    Ear Problem     Right ear pain on and off for past couple years     HPI:   Patient here with a right otalgia.  He has a history of TMJ syndrome.      Current Medications[1]   Past Medical History[2]   Social History:  Short Social Hx on File[3]     REVIEW OF SYSTEMS:   GENERAL HEALTH: feels well otherwise  GENERAL : denies fever, chills, sweats, weight loss, weight gain  SKIN: denies any unusual skin lesions or rashes  RESPIRATORY: denies shortness of breath with exertion  NEURO: denies headaches    EXAM:   Ht 5' 8\" (1.727 m)   Wt 152 lb 3.2 oz (69 kg)   BMI 23.14 kg/m²   System Details   Skin Inspection - Normal.   Constitutional Overall appearance - Normal.   Head/Face Facial features - Normal. Eyebrows - Normal. Skull - Normal.   Eyes Conjunctiva - Right: Normal, Left: Normal. Pupil - Right: Normal, Left: Normal.    Ears Inspection - Right: Normal, Left: Normal.   Canal - Right: Normal, Left: Normal.   TM - Right: Normal, Left: Normal.  No middle ear fluid either ear   Nasal External nose - Normal.   Nasal septum - Normal.  Turbinates - Normal.   Oral/Oropharynx Lips - Normal, Tonsils - Normal, Tongue - Normal   Clicking of the bilateral temporomandibular joints right greater than left but no pain with palpation.   Neck Exam Inspection - Normal. Palpation - Normal. Parotid gland - Normal. Thyroid gland -thyroid gland is granular to palpation.  No pain.   Lymph Detail Submental. Submandibular. Anterior cervical. Posterior cervical. Supraclavicular all without enlargement   Larynx Mirror examination with no obvious abnormalities   Psychiatric Orientation - Oriented to time, place, person & situation. Appropriate mood and affect.   Neurological Memory - Normal. Cranial nerves - Cranial nerves II through XII grossly intact.     ASSESSMENT AND PLAN:   1. Thyroiditis  Granular thyroid to palpation.  I checked a TSH, T3, T4, and  thyroglobulin antibody test all done on 10/9/2024 and within normal limits.  A thyroid ultrasound was ordered to better evaluate  - US THYROID (CPT=76536); Future    2. TMJ dysfunction  Handout given.  Obvious clicking but no pain on the date of the visit.    3.  Referred right otalgia  No evidence of otologic abnormality on the date of the visit.      The patient indicates understanding of these issues and agrees to the plan.      Raya Nayak MD  4/11/2025  8:45 PM       [1]   Current Outpatient Medications   Medication Sig Dispense Refill    BUPROPION  MG Oral Tablet 24 Hr TAKE 1 TABLET BY MOUTH EVERY DAY 90 tablet 0    ondansetron 4 MG Oral Tablet Dispersible Take 1 tablet (4 mg total) by mouth every 8 (eight) hours as needed for Nausea. 30 tablet 2    CUSTOM MEDICATION Semaglutide/ cyanocobalamin    1.7 mg-   Concentration: 5 mg/ 0.5 mL  Amount: 2.5 ML   Instructions: Inject 34 units/ aka 0.34 mL sq q weekly 1 each 5    Phentermine HCl 37.5 MG Oral Tab TAKE 1 TABLET (37.5 MG TOTAL) BY MOUTH NOON. 30 tablet 5    ESCITALOPRAM 20 MG Oral Tab TAKE 1 AND 1/2 TABLETS DAILY BY MOUTH 135 tablet 1    pseudoephedrine 30 MG Oral Tab Take 1 tablet (30 mg total) by mouth every 4 (four) hours as needed for congestion. 20 tablet 0    cyclobenzaprine 5 MG Oral Tab Take 1 tablet (5 mg total) by mouth nightly. 30 tablet 1    CELECOXIB 200 MG Oral Cap TAKE 1 CAPSULE BY MOUTH DAILY AS NEEDED FOR PAIN. 30 capsule 0   [2]   Past Medical History:   Anxiety state, unspecified    Per NG: Anxiety-panic attacks    Celiac disease (HCC)    Celiac disease (HCC)    Constipation    Hyperlipidemia    Pregnancy (HCC)    Per NG: vaginal deliveries times 3    S/P cystoscopy    Per NG: Stent placement cystoscopy    S/P cystoscopy    per NG: Stent removal cystoscopy    Urolithiasis    Vertigo   [3]   Social History  Socioeconomic History    Marital status:    Tobacco Use    Smoking status: Never    Smokeless tobacco: Never   Vaping  Use    Vaping status: Never Used   Substance and Sexual Activity    Alcohol use: Yes     Alcohol/week: 1.0 standard drink of alcohol     Types: 1 Glasses of wine per week     Comment: rarely    Drug use: No   Social History Narrative    3 children

## 2025-04-22 DIAGNOSIS — F32.9 REACTIVE DEPRESSION: ICD-10-CM

## 2025-04-22 RX ORDER — BUPROPION HYDROCHLORIDE 150 MG/1
150 TABLET ORAL DAILY
Qty: 90 TABLET | Refills: 0 | OUTPATIENT
Start: 2025-04-22

## 2025-05-02 ENCOUNTER — HOSPITAL ENCOUNTER (OUTPATIENT)
Dept: ULTRASOUND IMAGING | Facility: HOSPITAL | Age: 50
Discharge: HOME | End: 2025-05-02
Attending: SPECIALIST
Payer: COMMERCIAL

## 2025-05-02 DIAGNOSIS — E06.9 THYROIDITIS: ICD-10-CM

## 2025-05-02 PROCEDURE — 76536 US EXAM OF HEAD AND NECK: CPT | Performed by: SPECIALIST

## 2025-05-29 PROBLEM — F32.9 REACTIVE DEPRESSION: Status: ACTIVE | Noted: 2025-05-29

## 2025-07-19 DIAGNOSIS — F43.9 REACTION TO SEVERE STRESS, UNSPECIFIED: ICD-10-CM

## 2025-07-21 RX ORDER — ESCITALOPRAM OXALATE 20 MG/1
30 TABLET ORAL DAILY
Qty: 135 TABLET | Refills: 0 | Status: SHIPPED | OUTPATIENT
Start: 2025-07-21

## (undated) DIAGNOSIS — F43.9 STRESS: ICD-10-CM

## (undated) DIAGNOSIS — E78.2 MIXED HYPERLIPIDEMIA: ICD-10-CM

## (undated) NOTE — LETTER
Rosalind Babb, [address] [facility], [facility]       01/18/19        Patient: Corinne Schaefer   YOB: 1975   Date of Visit: 1/18/2019       Dear  Dr. Darien Dean MD,      Thank you for referring Corinne Schaefer to my practice.

## (undated) NOTE — ED AVS SNAPSHOT
Kathleen Paris   MRN: [id number]    Department:  [facility] Emergency Department   Date of Visit:  6/29/2018           Disclosure     Insurance plans vary and the physician(s) referred by the ER may not be covered by your plan.  Please contact CARE PHYSICIAN AT ONCE OR RETURN IMMEDIATELY TO THE EMERGENCY DEPARTMENT. If you have been prescribed any medication(s), please fill your prescription right away and begin taking the medication(s) as directed.   If you believe that any of the medications

## (undated) NOTE — MR AVS SNAPSHOT
[facility] - [place] [address] [address] [id number] [phone]               Thank you for choosing us for your health care visit with Frederic Hobson MD.  We are glad to serve you and happy to provide you with this summary of yo 88/61 mmHg 88 5' 8\" (1.727 m) 164 lb (74.39 kg) 24.94 kg/m2         Current Medications          This list is accurate as of: 3/20/17  1:46 PM.  Always use your most recent med list.                azithromycin 250 MG Tabs   Take two tablets by mouth tod Call [phone] for help. [facility] is NOT to be used for urgent needs. For medical emergencies, dial 911.            Visit [facility] online at  [email or web address]

## (undated) NOTE — LETTER
1/[birth year]              [address] [facility], [place] [address]         Dear Lilo Guido,    This letter is to inform you that our office has made several attempts to reach you by phone without success.   We were attempting to conta

## (undated) NOTE — ED AVS SNAPSHOT
Sherly Peres   MRN: [id number]    Department:  [facility] Emergency Department   Date of Visit:  6/9/2019           Disclosure     Insurance plans vary and the physician(s) referred by the ER may not be covered by your plan.  Please cont CARE PHYSICIAN AT ONCE OR RETURN IMMEDIATELY TO THE EMERGENCY DEPARTMENT. If you have been prescribed any medication(s), please fill your prescription right away and begin taking the medication(s) as directed.   If you believe that any of the medications